# Patient Record
Sex: FEMALE | Race: WHITE | NOT HISPANIC OR LATINO
[De-identification: names, ages, dates, MRNs, and addresses within clinical notes are randomized per-mention and may not be internally consistent; named-entity substitution may affect disease eponyms.]

---

## 2017-02-23 ENCOUNTER — MEDICATION RENEWAL (OUTPATIENT)
Age: 72
End: 2017-02-23

## 2017-05-25 ENCOUNTER — MEDICATION RENEWAL (OUTPATIENT)
Age: 72
End: 2017-05-25

## 2017-07-19 ENCOUNTER — APPOINTMENT (OUTPATIENT)
Dept: INTERNAL MEDICINE | Facility: CLINIC | Age: 72
End: 2017-07-19
Payer: MEDICARE

## 2017-07-19 VITALS
OXYGEN SATURATION: 97 % | SYSTOLIC BLOOD PRESSURE: 136 MMHG | BODY MASS INDEX: 26.54 KG/M2 | TEMPERATURE: 98 F | RESPIRATION RATE: 14 BRPM | WEIGHT: 123 LBS | DIASTOLIC BLOOD PRESSURE: 70 MMHG | HEART RATE: 71 BPM | HEIGHT: 57 IN

## 2017-07-19 PROCEDURE — G0439: CPT

## 2017-07-19 PROCEDURE — 99397 PER PM REEVAL EST PAT 65+ YR: CPT

## 2017-07-20 LAB
25(OH)D3 SERPL-MCNC: 39.5 NG/ML
ALBUMIN SERPL ELPH-MCNC: 4.7 G/DL
ALP BLD-CCNC: 77 U/L
ALT SERPL-CCNC: 20 U/L
ANION GAP SERPL CALC-SCNC: 15 MMOL/L
APPEARANCE: CLEAR
AST SERPL-CCNC: 21 U/L
BACTERIA: NEGATIVE
BASOPHILS # BLD AUTO: 0.02 K/UL
BASOPHILS NFR BLD AUTO: 0.4 %
BILIRUB SERPL-MCNC: 0.6 MG/DL
BILIRUBIN URINE: NEGATIVE
BLOOD URINE: ABNORMAL
BUN SERPL-MCNC: 13 MG/DL
CALCIUM SERPL-MCNC: 9.8 MG/DL
CHLORIDE SERPL-SCNC: 103 MMOL/L
CHOLEST SERPL-MCNC: 218 MG/DL
CHOLEST/HDLC SERPL: 3.2 RATIO
CO2 SERPL-SCNC: 25 MMOL/L
COLOR: YELLOW
CREAT SERPL-MCNC: 0.77 MG/DL
EOSINOPHIL # BLD AUTO: 0.21 K/UL
EOSINOPHIL NFR BLD AUTO: 3.9 %
FOLATE SERPL-MCNC: >20 NG/ML
GLUCOSE QUALITATIVE U: NORMAL MG/DL
GLUCOSE SERPL-MCNC: 102 MG/DL
HBA1C MFR BLD HPLC: 5.5 %
HCT VFR BLD CALC: 40.1 %
HDLC SERPL-MCNC: 68 MG/DL
HGB BLD-MCNC: 12.9 G/DL
IMM GRANULOCYTES NFR BLD AUTO: 0 %
KETONES URINE: NEGATIVE
LDLC SERPL CALC-MCNC: 127 MG/DL
LEUKOCYTE ESTERASE URINE: NEGATIVE
LYMPHOCYTES # BLD AUTO: 1.46 K/UL
LYMPHOCYTES NFR BLD AUTO: 27.3 %
MAN DIFF?: NORMAL
MCHC RBC-ENTMCNC: 27.5 PG
MCHC RBC-ENTMCNC: 32.2 GM/DL
MCV RBC AUTO: 85.5 FL
MICROSCOPIC-UA: NORMAL
MONOCYTES # BLD AUTO: 0.48 K/UL
MONOCYTES NFR BLD AUTO: 9 %
NEUTROPHILS # BLD AUTO: 3.17 K/UL
NEUTROPHILS NFR BLD AUTO: 59.4 %
NITRITE URINE: NEGATIVE
PH URINE: 5
PLATELET # BLD AUTO: 272 K/UL
POTASSIUM SERPL-SCNC: 4.6 MMOL/L
PROT SERPL-MCNC: 7.3 G/DL
PROTEIN URINE: NEGATIVE MG/DL
RBC # BLD: 4.69 M/UL
RBC # FLD: 13.8 %
RED BLOOD CELLS URINE: 1 /HPF
SODIUM SERPL-SCNC: 143 MMOL/L
SPECIFIC GRAVITY URINE: 1.01
SQUAMOUS EPITHELIAL CELLS: 0 /HPF
TRIGL SERPL-MCNC: 117 MG/DL
TSH SERPL-ACNC: 2.73 UIU/ML
UROBILINOGEN URINE: NORMAL MG/DL
VIT B12 SERPL-MCNC: 499 PG/ML
WBC # FLD AUTO: 5.34 K/UL
WHITE BLOOD CELLS URINE: 0 /HPF

## 2017-07-24 LAB — HEMOCCULT STL QL IA: NEGATIVE

## 2017-08-09 ENCOUNTER — MEDICATION RENEWAL (OUTPATIENT)
Age: 72
End: 2017-08-09

## 2018-04-06 ENCOUNTER — MEDICATION RENEWAL (OUTPATIENT)
Age: 73
End: 2018-04-06

## 2018-06-28 LAB
25(OH)D3 SERPL-MCNC: 31.3 NG/ML
ALBUMIN SERPL ELPH-MCNC: 4.4 G/DL
ALP BLD-CCNC: 75 U/L
ALT SERPL-CCNC: 12 U/L
ANION GAP SERPL CALC-SCNC: 13 MMOL/L
APPEARANCE: CLEAR
AST SERPL-CCNC: 19 U/L
BACTERIA: NEGATIVE
BASOPHILS # BLD AUTO: 0.02 K/UL
BASOPHILS NFR BLD AUTO: 0.4 %
BILIRUB SERPL-MCNC: 0.6 MG/DL
BILIRUBIN URINE: NEGATIVE
BLOOD URINE: ABNORMAL
BUN SERPL-MCNC: 15 MG/DL
CALCIUM SERPL-MCNC: 9.4 MG/DL
CHLORIDE SERPL-SCNC: 103 MMOL/L
CHOLEST SERPL-MCNC: 187 MG/DL
CHOLEST/HDLC SERPL: 2.8 RATIO
CO2 SERPL-SCNC: 24 MMOL/L
COLOR: YELLOW
CREAT SERPL-MCNC: 0.73 MG/DL
EOSINOPHIL # BLD AUTO: 0.15 K/UL
EOSINOPHIL NFR BLD AUTO: 3.4 %
FOLATE SERPL-MCNC: >20 NG/ML
GLUCOSE QUALITATIVE U: NEGATIVE MG/DL
GLUCOSE SERPL-MCNC: 97 MG/DL
HBA1C MFR BLD HPLC: 5.3 %
HCT VFR BLD CALC: 40 %
HDLC SERPL-MCNC: 66 MG/DL
HGB BLD-MCNC: 12.9 G/DL
IMM GRANULOCYTES NFR BLD AUTO: 0.2 %
KETONES URINE: NEGATIVE
LDLC SERPL CALC-MCNC: 99 MG/DL
LEUKOCYTE ESTERASE URINE: NEGATIVE
LYMPHOCYTES # BLD AUTO: 1.4 K/UL
LYMPHOCYTES NFR BLD AUTO: 31.5 %
MAN DIFF?: NORMAL
MCHC RBC-ENTMCNC: 27.4 PG
MCHC RBC-ENTMCNC: 32.3 GM/DL
MCV RBC AUTO: 85.1 FL
MICROSCOPIC-UA: NORMAL
MONOCYTES # BLD AUTO: 0.57 K/UL
MONOCYTES NFR BLD AUTO: 12.8 %
NEUTROPHILS # BLD AUTO: 2.3 K/UL
NEUTROPHILS NFR BLD AUTO: 51.7 %
NITRITE URINE: NEGATIVE
PH URINE: 5
PLATELET # BLD AUTO: 255 K/UL
POTASSIUM SERPL-SCNC: 4.1 MMOL/L
PROT SERPL-MCNC: 7.3 G/DL
PROTEIN URINE: NEGATIVE MG/DL
RBC # BLD: 4.7 M/UL
RBC # FLD: 13.6 %
RED BLOOD CELLS URINE: 3 /HPF
SODIUM SERPL-SCNC: 140 MMOL/L
SPECIFIC GRAVITY URINE: 1.02
SQUAMOUS EPITHELIAL CELLS: 1 /HPF
TRIGL SERPL-MCNC: 109 MG/DL
TSH SERPL-ACNC: 2.32 UIU/ML
UROBILINOGEN URINE: NEGATIVE MG/DL
VIT B12 SERPL-MCNC: 512 PG/ML
WBC # FLD AUTO: 4.45 K/UL
WHITE BLOOD CELLS URINE: 1 /HPF

## 2018-06-29 ENCOUNTER — MEDICATION RENEWAL (OUTPATIENT)
Age: 73
End: 2018-06-29

## 2018-07-03 ENCOUNTER — APPOINTMENT (OUTPATIENT)
Dept: INTERNAL MEDICINE | Facility: CLINIC | Age: 73
End: 2018-07-03
Payer: MEDICARE

## 2018-07-03 VITALS
TEMPERATURE: 98.3 F | HEIGHT: 57 IN | HEART RATE: 87 BPM | BODY MASS INDEX: 26.54 KG/M2 | SYSTOLIC BLOOD PRESSURE: 126 MMHG | WEIGHT: 123 LBS | OXYGEN SATURATION: 97 % | RESPIRATION RATE: 14 BRPM | DIASTOLIC BLOOD PRESSURE: 72 MMHG

## 2018-07-03 PROCEDURE — 99214 OFFICE O/P EST MOD 30 MIN: CPT

## 2018-07-03 NOTE — HISTORY OF PRESENT ILLNESS
[de-identified] : pt here today for thyroid check. Overall feels well.\par Pt did not go for mammogram. Understands risk .\par

## 2018-07-06 ENCOUNTER — APPOINTMENT (OUTPATIENT)
Dept: INTERNAL MEDICINE | Facility: CLINIC | Age: 73
End: 2018-07-06

## 2018-07-24 ENCOUNTER — APPOINTMENT (OUTPATIENT)
Dept: INTERNAL MEDICINE | Facility: CLINIC | Age: 73
End: 2018-07-24

## 2018-12-17 ENCOUNTER — APPOINTMENT (OUTPATIENT)
Dept: INTERNAL MEDICINE | Facility: CLINIC | Age: 73
End: 2018-12-17
Payer: MEDICARE

## 2018-12-17 PROCEDURE — G0008: CPT

## 2018-12-17 PROCEDURE — 90662 IIV NO PRSV INCREASED AG IM: CPT

## 2019-02-15 ENCOUNTER — MEDICATION RENEWAL (OUTPATIENT)
Age: 74
End: 2019-02-15

## 2019-05-06 LAB
ALBUMIN SERPL ELPH-MCNC: 4.5 G/DL
ALP BLD-CCNC: 54 U/L
ALT SERPL-CCNC: 14 U/L
ANION GAP SERPL CALC-SCNC: 10 MMOL/L
APPEARANCE: ABNORMAL
AST SERPL-CCNC: 14 U/L
BACTERIA: NEGATIVE
BASOPHILS # BLD AUTO: 0.04 K/UL
BASOPHILS NFR BLD AUTO: 0.8 %
BILIRUB SERPL-MCNC: 0.6 MG/DL
BILIRUBIN URINE: NEGATIVE
BLOOD URINE: ABNORMAL
BUN SERPL-MCNC: 18 MG/DL
CALCIUM SERPL-MCNC: 9.7 MG/DL
CHLORIDE SERPL-SCNC: 102 MMOL/L
CHOLEST SERPL-MCNC: 193 MG/DL
CHOLEST/HDLC SERPL: 3.1 RATIO
CO2 SERPL-SCNC: 27 MMOL/L
COLOR: YELLOW
CREAT SERPL-MCNC: 0.66 MG/DL
EOSINOPHIL # BLD AUTO: 0.23 K/UL
EOSINOPHIL NFR BLD AUTO: 4.4 %
ESTIMATED AVERAGE GLUCOSE: 117 MG/DL
FOLATE SERPL-MCNC: >20 NG/ML
GLUCOSE QUALITATIVE U: NEGATIVE
GLUCOSE SERPL-MCNC: 98 MG/DL
HBA1C MFR BLD HPLC: 5.7 %
HCT VFR BLD CALC: 40.2 %
HDLC SERPL-MCNC: 62 MG/DL
HGB BLD-MCNC: 12.4 G/DL
HYALINE CASTS: 0 /LPF
IMM GRANULOCYTES NFR BLD AUTO: 0.4 %
KETONES URINE: NEGATIVE
LDLC SERPL CALC-MCNC: 114 MG/DL
LEUKOCYTE ESTERASE URINE: NEGATIVE
LYMPHOCYTES # BLD AUTO: 1.73 K/UL
LYMPHOCYTES NFR BLD AUTO: 33.3 %
MAN DIFF?: NORMAL
MCHC RBC-ENTMCNC: 27 PG
MCHC RBC-ENTMCNC: 30.8 GM/DL
MCV RBC AUTO: 87.6 FL
MICROSCOPIC-UA: NORMAL
MONOCYTES # BLD AUTO: 0.63 K/UL
MONOCYTES NFR BLD AUTO: 12.1 %
NEUTROPHILS # BLD AUTO: 2.54 K/UL
NEUTROPHILS NFR BLD AUTO: 49 %
NITRITE URINE: NEGATIVE
PH URINE: 6.5
PLATELET # BLD AUTO: 244 K/UL
POTASSIUM SERPL-SCNC: 4 MMOL/L
PROT SERPL-MCNC: 6.8 G/DL
PROTEIN URINE: NORMAL
RBC # BLD: 4.59 M/UL
RBC # FLD: 13.4 %
RED BLOOD CELLS URINE: 3 /HPF
SODIUM SERPL-SCNC: 139 MMOL/L
SPECIFIC GRAVITY URINE: 1.02
SQUAMOUS EPITHELIAL CELLS: 1 /HPF
TRIGL SERPL-MCNC: 87 MG/DL
TSH SERPL-ACNC: 3.45 UIU/ML
UROBILINOGEN URINE: NORMAL
VIT B12 SERPL-MCNC: 601 PG/ML
WBC # FLD AUTO: 5.19 K/UL
WHITE BLOOD CELLS URINE: 1 /HPF

## 2019-05-07 LAB — 25(OH)D3 SERPL-MCNC: 39.2 NG/ML

## 2019-05-13 ENCOUNTER — NON-APPOINTMENT (OUTPATIENT)
Age: 74
End: 2019-05-13

## 2019-05-13 ENCOUNTER — APPOINTMENT (OUTPATIENT)
Dept: INTERNAL MEDICINE | Facility: CLINIC | Age: 74
End: 2019-05-13
Payer: MEDICARE

## 2019-05-13 VITALS
BODY MASS INDEX: 26.32 KG/M2 | HEART RATE: 75 BPM | RESPIRATION RATE: 14 BRPM | HEIGHT: 57 IN | DIASTOLIC BLOOD PRESSURE: 70 MMHG | OXYGEN SATURATION: 98 % | WEIGHT: 122 LBS | SYSTOLIC BLOOD PRESSURE: 120 MMHG | TEMPERATURE: 98.2 F

## 2019-05-13 PROCEDURE — 93000 ELECTROCARDIOGRAM COMPLETE: CPT

## 2019-05-13 PROCEDURE — G0439: CPT

## 2019-05-13 NOTE — PHYSICAL EXAM

## 2019-05-13 NOTE — HEALTH RISK ASSESSMENT
[Very Good] : ~his/her~  mood as very good [] : No [No falls in past year] : Patient reported no falls in the past year [0] : 2) Feeling down, depressed, or hopeless: Not at all (0) [MammogramComments] : n

## 2019-05-14 ENCOUNTER — APPOINTMENT (OUTPATIENT)
Dept: INTERNAL MEDICINE | Facility: CLINIC | Age: 74
End: 2019-05-14

## 2019-06-04 ENCOUNTER — RX RENEWAL (OUTPATIENT)
Age: 74
End: 2019-06-04

## 2019-11-11 ENCOUNTER — RX RENEWAL (OUTPATIENT)
Age: 74
End: 2019-11-11

## 2019-11-13 ENCOUNTER — APPOINTMENT (OUTPATIENT)
Dept: INTERNAL MEDICINE | Facility: CLINIC | Age: 74
End: 2019-11-13
Payer: MEDICARE

## 2019-11-13 DIAGNOSIS — Z23 ENCOUNTER FOR IMMUNIZATION: ICD-10-CM

## 2019-11-13 PROCEDURE — G0008: CPT

## 2019-11-13 PROCEDURE — 90653 IIV ADJUVANT VACCINE IM: CPT

## 2020-02-04 ENCOUNTER — RX RENEWAL (OUTPATIENT)
Age: 75
End: 2020-02-04

## 2020-07-30 ENCOUNTER — APPOINTMENT (OUTPATIENT)
Dept: INTERNAL MEDICINE | Facility: CLINIC | Age: 75
End: 2020-07-30
Payer: MEDICARE

## 2020-07-30 VITALS
SYSTOLIC BLOOD PRESSURE: 136 MMHG | TEMPERATURE: 98.2 F | BODY MASS INDEX: 25.46 KG/M2 | WEIGHT: 118 LBS | HEIGHT: 57 IN | DIASTOLIC BLOOD PRESSURE: 84 MMHG | RESPIRATION RATE: 14 BRPM | HEART RATE: 72 BPM | OXYGEN SATURATION: 98 %

## 2020-07-30 DIAGNOSIS — M41.9 SCOLIOSIS, UNSPECIFIED: ICD-10-CM

## 2020-07-30 PROCEDURE — 99213 OFFICE O/P EST LOW 20 MIN: CPT

## 2020-07-30 NOTE — PHYSICAL EXAM
[No Acute Distress] : no acute distress [Well Nourished] : well nourished [Well Developed] : well developed [Well-Appearing] : well-appearing [Normal Sclera/Conjunctiva] : normal sclera/conjunctiva [PERRL] : pupils equal round and reactive to light [Normal Outer Ear/Nose] : the outer ears and nose were normal in appearance [EOMI] : extraocular movements intact [No JVD] : no jugular venous distention [Normal Oropharynx] : the oropharynx was normal [No Lymphadenopathy] : no lymphadenopathy [Thyroid Normal, No Nodules] : the thyroid was normal and there were no nodules present [Supple] : supple [Clear to Auscultation] : lungs were clear to auscultation bilaterally [No Accessory Muscle Use] : no accessory muscle use [No Respiratory Distress] : no respiratory distress  [Normal Rate] : normal rate  [Normal S1, S2] : normal S1 and S2 [Regular Rhythm] : with a regular rhythm [No Carotid Bruits] : no carotid bruits [No Murmur] : no murmur heard [Pedal Pulses Present] : the pedal pulses are present [No Varicosities] : no varicosities [No Abdominal Bruit] : a ~M bruit was not heard ~T in the abdomen [No Palpable Aorta] : no palpable aorta [No Edema] : there was no peripheral edema [No Extremity Clubbing/Cyanosis] : no extremity clubbing/cyanosis [Soft] : abdomen soft [Non Tender] : non-tender [Non-distended] : non-distended [No Masses] : no abdominal mass palpated [Normal Bowel Sounds] : normal bowel sounds [No HSM] : no HSM [Normal Anterior Cervical Nodes] : no anterior cervical lymphadenopathy [Normal Posterior Cervical Nodes] : no posterior cervical lymphadenopathy [No CVA Tenderness] : no CVA  tenderness [No Spinal Tenderness] : no spinal tenderness [No Joint Swelling] : no joint swelling [Grossly Normal Strength/Tone] : grossly normal strength/tone [No Rash] : no rash [Coordination Grossly Intact] : coordination grossly intact [No Focal Deficits] : no focal deficits [Normal Affect] : the affect was normal [Normal Gait] : normal gait [Normal Insight/Judgement] : insight and judgment were intact

## 2020-07-31 LAB
25(OH)D3 SERPL-MCNC: 33.8 NG/ML
ALBUMIN SERPL ELPH-MCNC: 4.7 G/DL
ALP BLD-CCNC: 61 U/L
ALT SERPL-CCNC: 9 U/L
ANION GAP SERPL CALC-SCNC: 14 MMOL/L
APPEARANCE: CLEAR
AST SERPL-CCNC: 14 U/L
BACTERIA: NEGATIVE
BASOPHILS # BLD AUTO: 0.05 K/UL
BASOPHILS NFR BLD AUTO: 0.8 %
BILIRUB SERPL-MCNC: 0.4 MG/DL
BILIRUBIN URINE: NEGATIVE
BLOOD URINE: ABNORMAL
BUN SERPL-MCNC: 16 MG/DL
CALCIUM SERPL-MCNC: 9.7 MG/DL
CHLORIDE SERPL-SCNC: 102 MMOL/L
CHOLEST SERPL-MCNC: 187 MG/DL
CHOLEST/HDLC SERPL: 3 RATIO
CO2 SERPL-SCNC: 25 MMOL/L
COLOR: NORMAL
CREAT SERPL-MCNC: 0.75 MG/DL
EOSINOPHIL # BLD AUTO: 0.11 K/UL
EOSINOPHIL NFR BLD AUTO: 1.8 %
ESTIMATED AVERAGE GLUCOSE: 111 MG/DL
FOLATE SERPL-MCNC: 18.3 NG/ML
GLUCOSE QUALITATIVE U: NEGATIVE
GLUCOSE SERPL-MCNC: 94 MG/DL
HBA1C MFR BLD HPLC: 5.5 %
HCT VFR BLD CALC: 40.1 %
HDLC SERPL-MCNC: 63 MG/DL
HGB BLD-MCNC: 12.8 G/DL
HYALINE CASTS: 1 /LPF
IMM GRANULOCYTES NFR BLD AUTO: 0.2 %
KETONES URINE: NEGATIVE
LDLC SERPL CALC-MCNC: 105 MG/DL
LEUKOCYTE ESTERASE URINE: NEGATIVE
LYMPHOCYTES # BLD AUTO: 1.79 K/UL
LYMPHOCYTES NFR BLD AUTO: 29.4 %
MAN DIFF?: NORMAL
MCHC RBC-ENTMCNC: 27.5 PG
MCHC RBC-ENTMCNC: 31.9 GM/DL
MCV RBC AUTO: 86.2 FL
MICROSCOPIC-UA: NORMAL
MONOCYTES # BLD AUTO: 0.58 K/UL
MONOCYTES NFR BLD AUTO: 9.5 %
NEUTROPHILS # BLD AUTO: 3.55 K/UL
NEUTROPHILS NFR BLD AUTO: 58.3 %
NITRITE URINE: NEGATIVE
PH URINE: 6
PLATELET # BLD AUTO: 280 K/UL
POTASSIUM SERPL-SCNC: 4.4 MMOL/L
PROT SERPL-MCNC: 6.7 G/DL
PROTEIN URINE: NEGATIVE
RBC # BLD: 4.65 M/UL
RBC # FLD: 13.5 %
RED BLOOD CELLS URINE: 3 /HPF
SODIUM SERPL-SCNC: 141 MMOL/L
SPECIFIC GRAVITY URINE: 1.01
SQUAMOUS EPITHELIAL CELLS: 1 /HPF
T4 SERPL-MCNC: 7.5 UG/DL
TRIGL SERPL-MCNC: 91 MG/DL
TSH SERPL-ACNC: 6.25 UIU/ML
UROBILINOGEN URINE: NORMAL
VIT B12 SERPL-MCNC: 475 PG/ML
WBC # FLD AUTO: 6.09 K/UL
WHITE BLOOD CELLS URINE: 1 /HPF

## 2020-08-02 ENCOUNTER — RX RENEWAL (OUTPATIENT)
Age: 75
End: 2020-08-02

## 2020-09-08 ENCOUNTER — APPOINTMENT (OUTPATIENT)
Dept: INTERNAL MEDICINE | Facility: CLINIC | Age: 75
End: 2020-09-08
Payer: MEDICARE

## 2020-09-08 ENCOUNTER — NON-APPOINTMENT (OUTPATIENT)
Age: 75
End: 2020-09-08

## 2020-09-08 ENCOUNTER — LABORATORY RESULT (OUTPATIENT)
Age: 75
End: 2020-09-08

## 2020-09-08 VITALS
RESPIRATION RATE: 14 BRPM | TEMPERATURE: 97.6 F | HEART RATE: 86 BPM | OXYGEN SATURATION: 97 % | BODY MASS INDEX: 25.89 KG/M2 | HEIGHT: 57 IN | SYSTOLIC BLOOD PRESSURE: 110 MMHG | DIASTOLIC BLOOD PRESSURE: 70 MMHG | WEIGHT: 120 LBS

## 2020-09-08 PROCEDURE — G0439: CPT

## 2020-09-08 PROCEDURE — 93000 ELECTROCARDIOGRAM COMPLETE: CPT

## 2020-09-08 NOTE — PHYSICAL EXAM
[No Acute Distress] : no acute distress [Well Nourished] : well nourished [Well Developed] : well developed [Well-Appearing] : well-appearing [Normal Sclera/Conjunctiva] : normal sclera/conjunctiva [PERRL] : pupils equal round and reactive to light [Normal Outer Ear/Nose] : the outer ears and nose were normal in appearance [EOMI] : extraocular movements intact [Normal Oropharynx] : the oropharynx was normal [No JVD] : no jugular venous distention [No Lymphadenopathy] : no lymphadenopathy [Thyroid Normal, No Nodules] : the thyroid was normal and there were no nodules present [Supple] : supple [No Respiratory Distress] : no respiratory distress  [No Accessory Muscle Use] : no accessory muscle use [Normal Rate] : normal rate  [Clear to Auscultation] : lungs were clear to auscultation bilaterally [Regular Rhythm] : with a regular rhythm [Normal S1, S2] : normal S1 and S2 [No Murmur] : no murmur heard [No Carotid Bruits] : no carotid bruits [No Abdominal Bruit] : a ~M bruit was not heard ~T in the abdomen [No Varicosities] : no varicosities [Pedal Pulses Present] : the pedal pulses are present [No Palpable Aorta] : no palpable aorta [No Edema] : there was no peripheral edema [No Extremity Clubbing/Cyanosis] : no extremity clubbing/cyanosis [Soft] : abdomen soft [Non Tender] : non-tender [Non-distended] : non-distended [No Masses] : no abdominal mass palpated [Normal Bowel Sounds] : normal bowel sounds [No HSM] : no HSM [Normal Posterior Cervical Nodes] : no posterior cervical lymphadenopathy [Normal Anterior Cervical Nodes] : no anterior cervical lymphadenopathy [No CVA Tenderness] : no CVA  tenderness [No Spinal Tenderness] : no spinal tenderness [No Joint Swelling] : no joint swelling [Grossly Normal Strength/Tone] : grossly normal strength/tone [Coordination Grossly Intact] : coordination grossly intact [No Rash] : no rash [No Focal Deficits] : no focal deficits [Normal Gait] : normal gait [Normal Insight/Judgement] : insight and judgment were intact [Normal Affect] : the affect was normal

## 2020-09-08 NOTE — ASSESSMENT
[FreeTextEntry1] : HCM- needs mammogram and screening colon\par Hypothyroidism- check repeat bloods today\par Optic nerve swelling- to get MRI tomorrow ordered by  neuro opthalmology

## 2020-09-09 LAB
T3RU NFR SERPL: 1 TBI
T4 FREE SERPL-MCNC: 1.6 NG/DL
TSH SERPL-ACNC: 5.24 UIU/ML

## 2020-11-05 ENCOUNTER — LABORATORY RESULT (OUTPATIENT)
Age: 75
End: 2020-11-05

## 2020-11-05 ENCOUNTER — APPOINTMENT (OUTPATIENT)
Dept: INTERNAL MEDICINE | Facility: CLINIC | Age: 75
End: 2020-11-05
Payer: MEDICARE

## 2020-11-05 PROCEDURE — 99072 ADDL SUPL MATRL&STAF TM PHE: CPT

## 2020-11-05 PROCEDURE — 90662 IIV NO PRSV INCREASED AG IM: CPT

## 2020-11-05 PROCEDURE — G0008: CPT

## 2020-11-06 LAB
T3RU NFR SERPL: 0.9 TBI
T4 FREE SERPL-MCNC: 1.8 NG/DL
TSH SERPL-ACNC: 1.62 UIU/ML

## 2021-01-13 ENCOUNTER — RX RENEWAL (OUTPATIENT)
Age: 76
End: 2021-01-13

## 2021-05-19 ENCOUNTER — RX RENEWAL (OUTPATIENT)
Age: 76
End: 2021-05-19

## 2021-11-03 ENCOUNTER — RX RENEWAL (OUTPATIENT)
Age: 76
End: 2021-11-03

## 2021-11-12 ENCOUNTER — NON-APPOINTMENT (OUTPATIENT)
Age: 76
End: 2021-11-12

## 2021-11-12 ENCOUNTER — APPOINTMENT (OUTPATIENT)
Dept: INTERNAL MEDICINE | Facility: CLINIC | Age: 76
End: 2021-11-12
Payer: MEDICARE

## 2021-11-12 VITALS
TEMPERATURE: 97 F | BODY MASS INDEX: 17.48 KG/M2 | WEIGHT: 118 LBS | RESPIRATION RATE: 14 BRPM | HEART RATE: 76 BPM | DIASTOLIC BLOOD PRESSURE: 70 MMHG | SYSTOLIC BLOOD PRESSURE: 130 MMHG | OXYGEN SATURATION: 93 % | HEIGHT: 69 IN

## 2021-11-12 DIAGNOSIS — R92.2 INCONCLUSIVE MAMMOGRAM: ICD-10-CM

## 2021-11-12 PROCEDURE — 90662 IIV NO PRSV INCREASED AG IM: CPT

## 2021-11-12 PROCEDURE — G0008: CPT

## 2021-11-12 PROCEDURE — G0439: CPT

## 2021-11-13 LAB
25(OH)D3 SERPL-MCNC: 29 NG/ML
ALBUMIN SERPL ELPH-MCNC: 4.4 G/DL
ALP BLD-CCNC: 65 U/L
ALT SERPL-CCNC: 9 U/L
ANION GAP SERPL CALC-SCNC: 17 MMOL/L
APPEARANCE: CLEAR
AST SERPL-CCNC: 13 U/L
BACTERIA: NEGATIVE
BASOPHILS # BLD AUTO: 0.04 K/UL
BASOPHILS NFR BLD AUTO: 0.7 %
BILIRUB SERPL-MCNC: 0.4 MG/DL
BILIRUBIN URINE: NEGATIVE
BLOOD URINE: NORMAL
BUN SERPL-MCNC: 16 MG/DL
CALCIUM SERPL-MCNC: 9.8 MG/DL
CHLORIDE SERPL-SCNC: 103 MMOL/L
CO2 SERPL-SCNC: 21 MMOL/L
COLOR: NORMAL
CREAT SERPL-MCNC: 0.73 MG/DL
EOSINOPHIL # BLD AUTO: 0.11 K/UL
EOSINOPHIL NFR BLD AUTO: 2 %
ESTIMATED AVERAGE GLUCOSE: 114 MG/DL
FOLATE SERPL-MCNC: 11.1 NG/ML
GLUCOSE QUALITATIVE U: NEGATIVE
GLUCOSE SERPL-MCNC: 89 MG/DL
HBA1C MFR BLD HPLC: 5.6 %
HCT VFR BLD CALC: 38.8 %
HGB BLD-MCNC: 12.2 G/DL
HYALINE CASTS: 1 /LPF
IMM GRANULOCYTES NFR BLD AUTO: 0.2 %
KETONES URINE: NEGATIVE
LEUKOCYTE ESTERASE URINE: NEGATIVE
LYMPHOCYTES # BLD AUTO: 1.54 K/UL
LYMPHOCYTES NFR BLD AUTO: 28.5 %
MAN DIFF?: NORMAL
MCHC RBC-ENTMCNC: 26.9 PG
MCHC RBC-ENTMCNC: 31.4 GM/DL
MCV RBC AUTO: 85.5 FL
MICROSCOPIC-UA: NORMAL
MONOCYTES # BLD AUTO: 0.68 K/UL
MONOCYTES NFR BLD AUTO: 12.6 %
NEUTROPHILS # BLD AUTO: 3.03 K/UL
NEUTROPHILS NFR BLD AUTO: 56 %
NITRITE URINE: NEGATIVE
PH URINE: 5
PLATELET # BLD AUTO: 272 K/UL
POTASSIUM SERPL-SCNC: 4 MMOL/L
PROT SERPL-MCNC: 6.7 G/DL
PROTEIN URINE: NEGATIVE
RBC # BLD: 4.54 M/UL
RBC # FLD: 13.4 %
RED BLOOD CELLS URINE: 1 /HPF
SODIUM SERPL-SCNC: 140 MMOL/L
SPECIFIC GRAVITY URINE: 1.01
SQUAMOUS EPITHELIAL CELLS: 0 /HPF
TSH SERPL-ACNC: 1.19 UIU/ML
UROBILINOGEN URINE: NORMAL
VIT B12 SERPL-MCNC: 456 PG/ML
WBC # FLD AUTO: 5.41 K/UL
WHITE BLOOD CELLS URINE: 1 /HPF

## 2022-04-29 ENCOUNTER — RX RENEWAL (OUTPATIENT)
Age: 77
End: 2022-04-29

## 2022-07-27 ENCOUNTER — RX RENEWAL (OUTPATIENT)
Age: 77
End: 2022-07-27

## 2022-09-12 ENCOUNTER — RX RENEWAL (OUTPATIENT)
Age: 77
End: 2022-09-12

## 2022-10-08 ENCOUNTER — EMERGENCY (EMERGENCY)
Facility: HOSPITAL | Age: 77
LOS: 1 days | Discharge: LEFT BEFORE DISCHARGE | End: 2022-10-08
Attending: STUDENT IN AN ORGANIZED HEALTH CARE EDUCATION/TRAINING PROGRAM | Admitting: STUDENT IN AN ORGANIZED HEALTH CARE EDUCATION/TRAINING PROGRAM

## 2022-10-08 VITALS
OXYGEN SATURATION: 100 % | RESPIRATION RATE: 18 BRPM | HEART RATE: 81 BPM | DIASTOLIC BLOOD PRESSURE: 65 MMHG | TEMPERATURE: 98 F | SYSTOLIC BLOOD PRESSURE: 134 MMHG

## 2022-10-08 VITALS
HEART RATE: 71 BPM | RESPIRATION RATE: 17 BRPM | DIASTOLIC BLOOD PRESSURE: 80 MMHG | SYSTOLIC BLOOD PRESSURE: 153 MMHG | OXYGEN SATURATION: 100 % | TEMPERATURE: 98 F

## 2022-10-08 LAB
ALBUMIN SERPL ELPH-MCNC: 4.8 G/DL — SIGNIFICANT CHANGE UP (ref 3.3–5)
ALP SERPL-CCNC: 64 U/L — SIGNIFICANT CHANGE UP (ref 40–120)
ALT FLD-CCNC: 11 U/L — SIGNIFICANT CHANGE UP (ref 4–33)
ANION GAP SERPL CALC-SCNC: 14 MMOL/L — SIGNIFICANT CHANGE UP (ref 7–14)
AST SERPL-CCNC: 17 U/L — SIGNIFICANT CHANGE UP (ref 4–32)
BASOPHILS # BLD AUTO: 0.03 K/UL — SIGNIFICANT CHANGE UP (ref 0–0.2)
BASOPHILS NFR BLD AUTO: 0.4 % — SIGNIFICANT CHANGE UP (ref 0–2)
BILIRUB SERPL-MCNC: 0.8 MG/DL — SIGNIFICANT CHANGE UP (ref 0.2–1.2)
BUN SERPL-MCNC: 10 MG/DL — SIGNIFICANT CHANGE UP (ref 7–23)
CALCIUM SERPL-MCNC: 9.5 MG/DL — SIGNIFICANT CHANGE UP (ref 8.4–10.5)
CHLORIDE SERPL-SCNC: 105 MMOL/L — SIGNIFICANT CHANGE UP (ref 98–107)
CO2 SERPL-SCNC: 23 MMOL/L — SIGNIFICANT CHANGE UP (ref 22–31)
CREAT SERPL-MCNC: 0.66 MG/DL — SIGNIFICANT CHANGE UP (ref 0.5–1.3)
EGFR: 89 ML/MIN/1.73M2 — SIGNIFICANT CHANGE UP
EOSINOPHIL # BLD AUTO: 0.05 K/UL — SIGNIFICANT CHANGE UP (ref 0–0.5)
EOSINOPHIL NFR BLD AUTO: 0.6 % — SIGNIFICANT CHANGE UP (ref 0–6)
GLUCOSE SERPL-MCNC: 105 MG/DL — HIGH (ref 70–99)
HCT VFR BLD CALC: 41.9 % — SIGNIFICANT CHANGE UP (ref 34.5–45)
HGB BLD-MCNC: 13.4 G/DL — SIGNIFICANT CHANGE UP (ref 11.5–15.5)
IANC: 5.06 K/UL — SIGNIFICANT CHANGE UP (ref 1.8–7.4)
IMM GRANULOCYTES NFR BLD AUTO: 0.3 % — SIGNIFICANT CHANGE UP (ref 0–0.9)
LYMPHOCYTES # BLD AUTO: 1.68 K/UL — SIGNIFICANT CHANGE UP (ref 1–3.3)
LYMPHOCYTES # BLD AUTO: 21.7 % — SIGNIFICANT CHANGE UP (ref 13–44)
MCHC RBC-ENTMCNC: 26.7 PG — LOW (ref 27–34)
MCHC RBC-ENTMCNC: 32 GM/DL — SIGNIFICANT CHANGE UP (ref 32–36)
MCV RBC AUTO: 83.5 FL — SIGNIFICANT CHANGE UP (ref 80–100)
MONOCYTES # BLD AUTO: 0.91 K/UL — HIGH (ref 0–0.9)
MONOCYTES NFR BLD AUTO: 11.7 % — SIGNIFICANT CHANGE UP (ref 2–14)
NEUTROPHILS # BLD AUTO: 5.06 K/UL — SIGNIFICANT CHANGE UP (ref 1.8–7.4)
NEUTROPHILS NFR BLD AUTO: 65.3 % — SIGNIFICANT CHANGE UP (ref 43–77)
NRBC # BLD: 0 /100 WBCS — SIGNIFICANT CHANGE UP (ref 0–0)
NRBC # FLD: 0 K/UL — SIGNIFICANT CHANGE UP (ref 0–0)
PLATELET # BLD AUTO: 322 K/UL — SIGNIFICANT CHANGE UP (ref 150–400)
POTASSIUM SERPL-MCNC: 3.7 MMOL/L — SIGNIFICANT CHANGE UP (ref 3.5–5.3)
POTASSIUM SERPL-SCNC: 3.7 MMOL/L — SIGNIFICANT CHANGE UP (ref 3.5–5.3)
PROT SERPL-MCNC: 7.3 G/DL — SIGNIFICANT CHANGE UP (ref 6–8.3)
RBC # BLD: 5.02 M/UL — SIGNIFICANT CHANGE UP (ref 3.8–5.2)
RBC # FLD: 13.9 % — SIGNIFICANT CHANGE UP (ref 10.3–14.5)
SARS-COV-2 RNA SPEC QL NAA+PROBE: SIGNIFICANT CHANGE UP
SODIUM SERPL-SCNC: 142 MMOL/L — SIGNIFICANT CHANGE UP (ref 135–145)
WBC # BLD: 7.75 K/UL — SIGNIFICANT CHANGE UP (ref 3.8–10.5)
WBC # FLD AUTO: 7.75 K/UL — SIGNIFICANT CHANGE UP (ref 3.8–10.5)

## 2022-10-08 PROCEDURE — 99284 EMERGENCY DEPT VISIT MOD MDM: CPT

## 2022-10-08 RX ORDER — ACETAMINOPHEN 500 MG
975 TABLET ORAL ONCE
Refills: 0 | Status: COMPLETED | OUTPATIENT
Start: 2022-10-08 | End: 2022-10-08

## 2022-10-08 RX ADMIN — Medication 975 MILLIGRAM(S): at 13:30

## 2022-10-08 NOTE — ED PROVIDER NOTE - OBJECTIVE STATEMENT
hx hypothyroid, papilledema dx in 2020 with neg MRI placed on latanoprost gtt, now with recurrence of papilledema. saw floaters so went to neuro-ophthalmologist. started on acetazolamide 250mg 2w ago stopped on 10/5. now c/o parietal HA bilaterally with trouble walking secondary to sensation of disequilibrium for the past 3w now worsening over the last week. states that the headache is worse at night but improved when she lays flat. had an episode of diplopia that lasted 5 minutes last week. describes another episode of confusion last week for a few minutes while on acetazolamide. takes dramamine q4h which helps. HA improves with tylenol. no nausea vomiting. no falls. tingling B hands intermittently. no speech changes. no neck pain back pain no fever chills. no changes to headache with valsalva. no hx CAD/strokes. no hx CA. no night sweats or weight loss.     needed sedation for MRI in the past  neuroophthalmologist - Dr. Peg Cunningham

## 2022-10-08 NOTE — ED ADULT NURSE REASSESSMENT NOTE - NS ED NURSE REASSESS COMMENT FT1
4395: Despite education from MD and Nurse patient verbalized wanting to discharged. The patient was informed she would be leaving against medical advice and the patient stated " I understand I rather go home and go to another place that my doctor would recommend.". The patient remains alert and oriented x4. No distress noted at this time. ELISABET ZEPEDA

## 2022-10-08 NOTE — ED ADULT NURSE NOTE - OBJECTIVE STATEMENT
Patient to the ED with c/o of headache after starting medication prescribed by the neuro- ophthalmologist. The patient verbalized taking latanoprost and acetazolamide. The patient verbalized having an double vision last week when she had a headache. Also verbalized her headache is controlled with tylenol PO. The patient stated " 250 mg of tylenol makes me feel better. I normally break a 500 mg tablet ".

## 2022-10-08 NOTE — ED PROVIDER NOTE - CLINICAL SUMMARY MEDICAL DECISION MAKING FREE TEXT BOX
well appearing female presents at recommendation of neuro-ophthalmology for MRI in the setting of recurrent papilledema refractory to ICP lowering meds and IOP lowering drops. otherwise neurointact aside from APD. optho cs screening labs TBA for sedated MRI

## 2022-10-08 NOTE — ED PROVIDER NOTE - ATTENDING CONTRIBUTION TO CARE
I have personally performed a face to face medical and diagnostic evaluation of the patient. I have discussed with and reviewed the Resident's note and agree with the History, ROS, Physical Exam and MDM unless otherwise indicated. A brief summary of my personal evaluation and impression can be found below.    David SALDANA: 77F hx of hypothyroid, papilledema dx in 2020 with neg MRI placed on latanoprost gtt, now with recurrence of papilledema. saw floaters so went to neuro-ophthalmologist. started on acetazolamide 250mg 2w ago stopped on 10/5. now c/o parietal HA bilaterally with trouble walking secondary to sensation of disequilibrium for the past 3w now worsening over the last week, was seen today by neuro opthalmologic was told to come to ED for eval given concern there for possible papilledema. No speech changes. Denies numbness, tingling or loss of sensation. Denies loss of motor function. No vision changes at present. Denies n/v/f/c/cp/sob. Denies syncope, lightheadedness, dizziness. Denies chest palpitations, abdominal pain. Denies edema. Denies dysuria, hematuria, BRBPR, tarry stools, diarrhea, constipation.     All other ROS negative, except as above and as per HPI and ROS section.      VITALS: Initial triage and subsequent vitals have been reviewed by me.  GEN APPEARANCE: WDWN, alert, non-toxic, NAD  HEAD: Atraumatic.  EYES: PERRLa, EOMI, vision grossly intact.   NECK: Supple  CV: RRR, S1S2, no c/r/m/g. Cap refill <2 seconds. No bruits.   LUNGS: CTAB. No abnormal breath sounds.  ABDOMEN: Soft, NTND. No guarding or rebound.   MSK/EXT: No spinal or extremity point tenderness. No CVA ttp. Pelvis stable. No peripheral edema.  NEURO: Alert, follows commands. Speech normal. Sensation and motor normal x4 extremities. CN2-12 normal, coordination normal, UE & LE 5/5 b/l.  SKIN: Warm, dry and intact. No rash.  PSYCH: Appropriate    Plan/MDM: David SALDANA: 77F hx of hypothyroid, papilledema dx in 2020 with neg MRI placed on latanoprost gtt, now with recurrence of papilledema. saw floaters so went to neuro-ophthalmologist. started on acetazolamide 250mg 2w ago stopped on 10/5. now c/o parietal HA bilaterally with trouble walking secondary to sensation of disequilibrium for the past 3w now worsening over the last week, was seen today by neuro opthalmologic was told to come to ED for eval given concern there for possible papilledema. No speech changes. Denies numbness, tingling or loss of sensation. Denies loss of motor function. No vision changes at present. exam vss non toxic PE as above optho at bedside during assessment - optho to perform comprehensive exam, ddx c/f likely papilledema as dx'd outpatient, unclear etiology thereof, optho rec plan for mri, will check labs cta head/neck, re-discuss w optho anticipate admission for mri.

## 2022-10-08 NOTE — CONSULT NOTE ADULT - ASSESSMENT
Assessment and Recommendations:  77y female with a past medical history/ocular history of hypothyroidism, papilledema consulted for ?APD and papilledema after being sent in by her neuroophthalmologist, found to have swollen optic discs bilaterally consistent with papilledema, with no vision changes. VA 20/25 OU, IOP within normal limits, PERRLA with no APD appreciated on exam, EOMI, CVF full, color plates full, anterior exam with mild cataracts. Dilated exam with optic nerve head swelling with blurred margins, slightly crowded disc OD, edema more significant OD > OS. Discussed with Dr. Cunningham as patient was previously worked up for papilledema in 2020, and appears to have multiple episodes in the past. Stated that she should have MRI for workup. Additionally, OD has always been nerve that has more swelling, no APD was appreciated on prior exams either.    1) Papilledema  - patient with bilateral optic nerve head swelling, previously documented in outpatient records OD > OS  - no vision changes, VA 20/25 stable from visit on 9/24 at [facility]  - no pupillary afferent defect  - slightly hypertensive on presentation with /80s  - no trauma, no falls  - has been having headaches, but   - differential includes ICP, pseudopapilledema, IIH (though patient older population and not obese), AVM  - recommend MRI brain and orbits w and w/o contrast  - recommend MRV as well to rule out any thrombosis  - pending MRI results can consider LP  - recommend neurology consult    Discussed with Dr. Cunningham, neuroophthalmologist. Discussed with Dr. Ruano, neuroophthalmologist.    Outpatient Follow-up: Patient should follow-up with his/her ophthalmologist or with [facility] Department of Ophthalmology within 1 week of after discharge at:    [address]  [place] [address]  [phone]    Bertram Turner MD, PGY2  Also available on [employer] Teams     Assessment and Recommendations:  77y female with a past medical history/ocular history of hypothyroidism, papilledema consulted for ?APD and papilledema after being sent in by her neuroophthalmologist, found to have swollen optic discs bilaterally consistent with papilledema, with no vision changes. VA 20/25 OU, IOP within normal limits, PERRLA with no APD appreciated on exam, EOMI, CVF full, color plates full, anterior exam with mild cataracts. Dilated exam with optic nerve head swelling with blurred margins, slightly crowded disc OD, edema more significant OD > OS. Discussed with Dr. Cunningham as patient was previously worked up for papilledema in 2020, and appears to have multiple episodes in the past. Stated that she should have MRI for workup. Additionally, OD has always been nerve that has more swelling, no APD was appreciated on prior exams either.    1) Papilledema  - patient with bilateral optic nerve head swelling, previously documented in outpatient records OD > OS  - no vision changes, VA 20/25 stable from visit on 9/24 at [facility]  - no pupillary afferent defect  - slightly hypertensive on presentation with /80s  - no trauma, no falls  - has been having headaches, but   - differential includes ICP, pseudopapilledema, IIH (though patient older population and not obese), AVM  - recommend MRI brain and orbits w and w/o contrast  - recommend MRV as well to rule out any thrombosis  - pending MRI results can consider LP  - recommend neurology consult    2) Choroidal nevus, OS  - flat in appearance  - no orange pigment  - close ti disc margin  - no drusen, no halo  - will need further imaging for characterization, unclear if present on prior exam from notes in chart    Discussed with Dr. Cunningham, neuroophthalmologist. Discussed with Dr. Ruano, neuroophthalmologist.    Outpatient Follow-up: Patient should follow-up with his/her ophthalmologist or with [facility] Department of Ophthalmology within 1 week of after discharge at:    [address]  [place] [address]  [phone]    Bertram Turner MD, PGY2  Also available on [employer] Teams

## 2022-10-08 NOTE — ED PROVIDER NOTE - NSFOLLOWUPINSTRUCTIONS_ED_ALL_ED_FT
You were seen in the Emergency Department for headache difficulty walking.     1) Advance activity as tolerated.   2) Continue all previously prescribed medications as directed.    3) Follow up with your primary care physician in 24-48 hours - take copies of your results.    4) Return to the Emergency Department for worsening or persistent symptoms, and/or ANY NEW OR CONCERNING SYMPTOMS.    you are not considered to be a safe discharge. for this reason you have signed a form stating that you are leaving against medical advice before a CT of the brain and its vessels or an MRI could be performed to investigate the cause of your papilledema.     Please return to the ER if any worsening symptoms arise including difficulty speaking, vision changes, numbness or weakness in one or more limbs, falls, severe headache, vomiting, fever, neck pain, eye pain, or any other new or concerning symptoms.     Please let your provider know that the testing was not done and that you need a prescription for imaging outpatient.

## 2022-10-08 NOTE — ED PROVIDER NOTE - PROGRESS NOTE DETAILS
TARI Galeas PGY2 ophtho aware. reaching out to Dr Cunningham TARI Galeas PGY2 improved after tylenol TARI Galeas PGY2 Pt declines CTA, when asked why she stated that she would still have to get the MRI and that the test was not definitive. I explained that the CT/CTA is to evaluate for stroke/TIA/masses which could be picked up earlier and she declines CT. She also wishes to leave against medical advice as her anxiety is too much to handle for a multiple-day hospital stay. Anxiolytics were offered. It was explained to the patient that progression of her condition could lead to blindness, permanent weakness, up to and including death cannot be excluded. Pt understands this and wishes to leave. She also understands that if her condition is to worsen that she would have to start the process over again beginning in the ED. She prefers to go home and schedule MRI with an outpatient center that has previously sedated her for MRIs in the past.

## 2022-10-08 NOTE — ED PROVIDER NOTE - PATIENT PORTAL LINK FT
You can access the FollowMyHealth Patient Portal offered by [facility] by registering at the following website: [email or web address]. By joining [facility] [employer]’s FollowMyHealth portal, you will also be able to view your health information using other applications (apps) compatible with our system.

## 2022-10-08 NOTE — ED PROVIDER NOTE - NS ED ROS FT
Constitutional: no fevers, chills  HEENT: +HA, +vision changes, no rhinorrhea, sore throat  Cardiac: no chest pain, palpitations  Respiratory: no SOB, cough or hemoptysis  GI: no n/v/d/c, abd pain, bloody or dark stools  : no dysuria, frequency, or hematuria  MSK: no joint pain, neck pain or back pain  Skin: no rashes, jaundice, pruritis  Neuro: +int numbness/tingling, no weakness, +unsteady gait  ROS otherwise neg except per hpi

## 2022-10-08 NOTE — CONSULT NOTE ADULT - SUBJECTIVE AND OBJECTIVE BOX
[facility] DEPARTMENT OF OPHTHALMOLOGY - INITIAL ADULT CONSULT  -----------------------------------------------------------------------------------------------------------------  Bertram Turner MD, PGY2  Available on [employer] Teams  -----------------------------------------------------------------------------------------------------------------    HPI:    Patient is a 77 year old female hx hypothyroid, papilledema dx in 2020 with neg MRI placed on latanoprost gtt, now with recurrence of papilledema. saw floaters so went to neuro-ophthalmologist. started on acetazolamide 250mg 2w ago stopped on 10/5. now c/o parietal HA bilaterally with trouble walking secondary to sensation of disequilibrium for the past 3w now worsening over the last week. states that the headache is worse at night but improved when she lays flat. had an episode of diplopia that lasted 5 minutes last week. describes another episode of confusion last week for a few minutes while on acetazolamide. takes dramamine q4h which helps. HA improves with tylenol. no nausea vomiting. no falls. tingling B hands intermittently. no speech changes. no neck pain back pain no fever chills. no changes to headache with valsalva. no hx CAD/strokes. no hx CA. no night sweats or weight loss.     Patient went to [facility] for ophthalmic evaluatoin on 9/24, note states having fan ocular migraines with zig zagging lines and few flashes of light. 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 No changes in vision. VA 20/25 OU at that time. Was recommended to continue her latanoprost and acetazolamide.  spoke with Dr. Cunningham, her neuroophthalmologist this morning, who recommended to go to ER for MRI. Patient also advised to stop acetazolamide. Patient states that her flashes of light started about 3 weeks ago and only happen intermittently. Endorses simialr headache, but no vision changes. Denies any eye pain, no TVL, no sudden black curtain, no floaters, no new flashes, no double vision.     Has needed sedation for MRI in the past    Past Medical History: hypothyroidism,   Past Ocular History: papilledema with negative MRI workup in 2020  Drops: latanoprost  Medications: acetazolamide (stopped recently) 250mg once a day  Allergies: penicillin  Outpatient Ophthalmologist: neuroophthalmologist - Dr. Peg Cunningham      Review of Systems:  Constitutional: No fever, chills  Eyes: No blurry vision, flashes, floaters, FBS, erythema, discharge, double vision, OU  Neuro: No tremors  Cardiovascular: No chest pain, palpitations  Respiratory: No SOB, no cough  GI: No nausea, vomiting, abdominal pain    Vital Signs: T(C): 36.7 (10-08-22 @ 11:04)  T(F): 98 (10-08-22 @ 11:04), Max: 98 (10-08-22 @ 11:04)  HR: 71 (10-08-22 @ 11:04) (71 - 71)  BP: 153/80 (10-08-22 @ 11:04) (153/80 - 153/80)  RR:  (17 - 17)  SpO2:  (100% - 100%)  Wt(kg): --  AAOx3    Ophthalmology Exam:  Visual acuity (cc): 20/25 OU.  Pupils: PERRL OU, no APD  Intraocular Pressure:  Ttono 15 OU  Extraocular movements (EOMs): Full OU, no pain, no diplopia.  Confrontational Visual Field (CVF): Full OU.  Color Plates: 12/12 OU.    Pen Light Exam (PLE)  External: Normal OU.  Lids/Lashes/Lacrimal Ducts: Flat OU.  Sclera/Conjunctiva: White and quiet OU.  Cornea: Clear OU.  Anterior Chamber: Deep and formed OU.    Iris: Flat OU.  Lens: NS OU    Fundus Exam: dilated with 1% tropicamide and 2.5% phenylephrine  Approval obtained from primary team for dilation  Patient aware that pupils can remained dilated for at least 4-6 hours.  Exam performed with 20 D lens    Vitreous: floater OU  Disc, cup/disc: elevated optic nerve with blurred margins, raised vessels OD > OS  Macula: wnl OU  Vessels: wnl OU  Periphery: wnl OU       [facility] DEPARTMENT OF OPHTHALMOLOGY - INITIAL ADULT CONSULT  -----------------------------------------------------------------------------------------------------------------  Bertram Turner MD, PGY2  Available on [employer] Teams  -----------------------------------------------------------------------------------------------------------------    HPI:    Patient is a 77 year old female hx hypothyroid, papilledema dx in 2020 with neg MRI placed on latanoprost gtt, now with recurrence of papilledema. saw floaters so went to neuro-ophthalmologist. started on acetazolamide 250mg 2w ago stopped on 10/5. now c/o parietal HA bilaterally with trouble walking secondary to sensation of disequilibrium for the past 3w now worsening over the last week. states that the headache is worse at night but improved when she lays flat. had an episode of diplopia that lasted 5 minutes last week. describes another episode of confusion last week for a few minutes while on acetazolamide. takes dramamine q4h which helps. HA improves with tylenol. no nausea vomiting. no falls. tingling B hands intermittently. no speech changes. no neck pain back pain no fever chills. no changes to headache with valsalva. no hx CAD/strokes. no hx CA. no night sweats or weight loss.     Patient went to [facility] for ophthalmic evaluatoin on 9/24, note states having fan ocular migraines with zig zagging lines and few flashes of light. 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 No changes in vision. VA 20/25 OU at that time. Was recommended to continue her latanoprost and acetazolamide.  spoke with Dr. Cunningham, her neuroophthalmologist this morning, who recommended to go to ER for MRI. Patient also advised to stop acetazolamide. Patient states that her flashes of light started about 3 weeks ago and only happen intermittently. Endorses simialr headache, but no vision changes. Denies any eye pain, no TVL, no sudden black curtain, no floaters, no new flashes, no double vision.     Has needed sedation for MRI in the past    Past Medical History: hypothyroidism,   Past Ocular History: papilledema with negative MRI workup in 2020  Drops: latanoprost  Medications: acetazolamide (stopped recently) 250mg once a day  Allergies: penicillin  Outpatient Ophthalmologist: neuroophthalmologist - Dr. Peg Cunningham      Review of Systems:  Constitutional: No fever, chills  Eyes: No blurry vision, flashes, floaters, FBS, erythema, discharge, double vision, OU  Neuro: No tremors  Cardiovascular: No chest pain, palpitations  Respiratory: No SOB, no cough  GI: No nausea, vomiting, abdominal pain    Vital Signs: T(C): 36.7 (10-08-22 @ 11:04)  T(F): 98 (10-08-22 @ 11:04), Max: 98 (10-08-22 @ 11:04)  HR: 71 (10-08-22 @ 11:04) (71 - 71)  BP: 153/80 (10-08-22 @ 11:04) (153/80 - 153/80)  RR:  (17 - 17)  SpO2:  (100% - 100%)  Wt(kg): --  AAOx3    Ophthalmology Exam:  Visual acuity (cc): 20/25 OU.  Pupils: PERRL OU, no APD  Intraocular Pressure:  Ttono 15 OU  Extraocular movements (EOMs): Full OU, no pain, no diplopia.  Confrontational Visual Field (CVF): Full OU.  Color Plates: 12/12 OU.    Pen Light Exam (PLE)  External: Normal OU.  Lids/Lashes/Lacrimal Ducts: Flat OU.  Sclera/Conjunctiva: White and quiet OU.  Cornea: Clear OU.  Anterior Chamber: Deep and formed OU.    Iris: Flat OU.  Lens: NS OU    Fundus Exam: dilated with 1% tropicamide and 2.5% phenylephrine  Approval obtained from primary team for dilation  Patient aware that pupils can remained dilated for at least 4-6 hours.  Exam performed with 20 D lens    Vitreous: floater OU  Disc, cup/disc: elevated optic nerve with blurred margins, raised vessels OD > OS, choroidal nevus about 1 disc diameter from disc OS  Macula: wnl OU  Vessels: wnl OU  Periphery: wnl OU

## 2022-10-08 NOTE — ED PROVIDER NOTE - PHYSICAL EXAMINATION
General: non-toxic, NAD  HEENT: NCAT, PERRL L afferent pupillary defect OD 20/20 OS 20/20 OU 20/20 no conjunctival injection.   Cardiac: RRR, no murmurs, 2+ peripheral pulses  Resp: CTAB  Abdomen: soft, non-distended, bowel sounds present, no ttp, no rebound or guarding. no organomegaly  Extremities: no peripheral edema, calf tenderness, or leg size discrepancies  Skin: no rashes  Neuro: AAOx4, 5+motor, sensation grossly intact CN 2-12 intact. no dysmetria. steady gait. negative romberg.   Psych: mood and affect appropriate

## 2022-10-08 NOTE — ED ADULT TRIAGE NOTE - CHIEF COMPLAINT QUOTE
Pt recently being treated for optic nerve swelling with Acetazolamide but due to side affects was discontinued on Tuesday. Pt presenting today c/o dizziness and head pressure. Pt noted to mildly hypertensive which pt states is abnormal for her. Denies blurry vision, neuro deficits.

## 2022-10-11 ENCOUNTER — EMERGENCY (EMERGENCY)
Facility: HOSPITAL | Age: 77
LOS: 1 days | Discharge: ANOTHER HOSPITAL | End: 2022-10-11
Attending: EMERGENCY MEDICINE | Admitting: EMERGENCY MEDICINE
Payer: MEDICARE

## 2022-10-11 ENCOUNTER — INPATIENT (INPATIENT)
Facility: HOSPITAL | Age: 77
LOS: 5 days | Discharge: HOME | End: 2022-10-17
Attending: STUDENT IN AN ORGANIZED HEALTH CARE EDUCATION/TRAINING PROGRAM | Admitting: STUDENT IN AN ORGANIZED HEALTH CARE EDUCATION/TRAINING PROGRAM

## 2022-10-11 VITALS
SYSTOLIC BLOOD PRESSURE: 137 MMHG | HEIGHT: 60 IN | TEMPERATURE: 98 F | RESPIRATION RATE: 18 BRPM | HEART RATE: 80 BPM | OXYGEN SATURATION: 99 % | DIASTOLIC BLOOD PRESSURE: 81 MMHG

## 2022-10-11 VITALS
RESPIRATION RATE: 18 BRPM | DIASTOLIC BLOOD PRESSURE: 85 MMHG | TEMPERATURE: 98 F | WEIGHT: 119.05 LBS | HEART RATE: 83 BPM | OXYGEN SATURATION: 97 % | SYSTOLIC BLOOD PRESSURE: 139 MMHG | HEIGHT: 60 IN

## 2022-10-11 DIAGNOSIS — Z87.898 PERSONAL HISTORY OF OTHER SPECIFIED CONDITIONS: ICD-10-CM

## 2022-10-11 LAB
ALBUMIN SERPL ELPH-MCNC: 4 G/DL — SIGNIFICANT CHANGE UP (ref 3.3–5)
ALP SERPL-CCNC: 62 U/L — SIGNIFICANT CHANGE UP (ref 40–120)
ALT FLD-CCNC: 18 U/L — SIGNIFICANT CHANGE UP (ref 12–78)
ANION GAP SERPL CALC-SCNC: 10 MMOL/L — SIGNIFICANT CHANGE UP (ref 5–17)
AST SERPL-CCNC: 13 U/L — LOW (ref 15–37)
BASOPHILS # BLD AUTO: 0.04 K/UL — SIGNIFICANT CHANGE UP (ref 0–0.2)
BASOPHILS NFR BLD AUTO: 0.6 % — SIGNIFICANT CHANGE UP (ref 0–2)
BILIRUB SERPL-MCNC: 0.6 MG/DL — SIGNIFICANT CHANGE UP (ref 0.2–1.2)
BUN SERPL-MCNC: 13 MG/DL — SIGNIFICANT CHANGE UP (ref 7–23)
CALCIUM SERPL-MCNC: 9.6 MG/DL — SIGNIFICANT CHANGE UP (ref 8.5–10.1)
CHLORIDE SERPL-SCNC: 107 MMOL/L — SIGNIFICANT CHANGE UP (ref 96–108)
CO2 SERPL-SCNC: 24 MMOL/L — SIGNIFICANT CHANGE UP (ref 22–31)
CREAT SERPL-MCNC: 0.91 MG/DL — SIGNIFICANT CHANGE UP (ref 0.5–1.3)
EGFR: 64 ML/MIN/1.73M2 — SIGNIFICANT CHANGE UP
EOSINOPHIL # BLD AUTO: 0.06 K/UL — SIGNIFICANT CHANGE UP (ref 0–0.5)
EOSINOPHIL NFR BLD AUTO: 0.9 % — SIGNIFICANT CHANGE UP (ref 0–6)
GLUCOSE SERPL-MCNC: 125 MG/DL — HIGH (ref 70–99)
HCT VFR BLD CALC: 41.8 % — SIGNIFICANT CHANGE UP (ref 34.5–45)
HGB BLD-MCNC: 13.4 G/DL — SIGNIFICANT CHANGE UP (ref 11.5–15.5)
IMM GRANULOCYTES NFR BLD AUTO: 0.3 % — SIGNIFICANT CHANGE UP (ref 0–0.9)
LYMPHOCYTES # BLD AUTO: 1.6 K/UL — SIGNIFICANT CHANGE UP (ref 1–3.3)
LYMPHOCYTES # BLD AUTO: 23.7 % — SIGNIFICANT CHANGE UP (ref 13–44)
MCHC RBC-ENTMCNC: 27.1 PG — SIGNIFICANT CHANGE UP (ref 27–34)
MCHC RBC-ENTMCNC: 32.1 GM/DL — SIGNIFICANT CHANGE UP (ref 32–36)
MCV RBC AUTO: 84.4 FL — SIGNIFICANT CHANGE UP (ref 80–100)
MONOCYTES # BLD AUTO: 0.75 K/UL — SIGNIFICANT CHANGE UP (ref 0–0.9)
MONOCYTES NFR BLD AUTO: 11.1 % — SIGNIFICANT CHANGE UP (ref 2–14)
NEUTROPHILS # BLD AUTO: 4.29 K/UL — SIGNIFICANT CHANGE UP (ref 1.8–7.4)
NEUTROPHILS NFR BLD AUTO: 63.4 % — SIGNIFICANT CHANGE UP (ref 43–77)
NRBC # BLD: 0 /100 WBCS — SIGNIFICANT CHANGE UP (ref 0–0)
PLATELET # BLD AUTO: 317 K/UL — SIGNIFICANT CHANGE UP (ref 150–400)
POTASSIUM SERPL-MCNC: 3.8 MMOL/L — SIGNIFICANT CHANGE UP (ref 3.5–5.3)
POTASSIUM SERPL-SCNC: 3.8 MMOL/L — SIGNIFICANT CHANGE UP (ref 3.5–5.3)
PROT SERPL-MCNC: 7.4 G/DL — SIGNIFICANT CHANGE UP (ref 6–8.3)
RBC # BLD: 4.95 M/UL — SIGNIFICANT CHANGE UP (ref 3.8–5.2)
RBC # FLD: 14 % — SIGNIFICANT CHANGE UP (ref 10.3–14.5)
SARS-COV-2 RNA SPEC QL NAA+PROBE: SIGNIFICANT CHANGE UP
SODIUM SERPL-SCNC: 141 MMOL/L — SIGNIFICANT CHANGE UP (ref 135–145)
WBC # BLD: 6.76 K/UL — SIGNIFICANT CHANGE UP (ref 3.8–10.5)
WBC # FLD AUTO: 6.76 K/UL — SIGNIFICANT CHANGE UP (ref 3.8–10.5)

## 2022-10-11 PROCEDURE — 93005 ELECTROCARDIOGRAM TRACING: CPT

## 2022-10-11 PROCEDURE — 71045 X-RAY EXAM CHEST 1 VIEW: CPT

## 2022-10-11 PROCEDURE — 85025 COMPLETE CBC W/AUTO DIFF WBC: CPT

## 2022-10-11 PROCEDURE — 99223 1ST HOSP IP/OBS HIGH 75: CPT

## 2022-10-11 PROCEDURE — 36415 COLL VENOUS BLD VENIPUNCTURE: CPT

## 2022-10-11 PROCEDURE — 99285 EMERGENCY DEPT VISIT HI MDM: CPT | Mod: 25

## 2022-10-11 PROCEDURE — 99284 EMERGENCY DEPT VISIT MOD MDM: CPT | Mod: GC

## 2022-10-11 PROCEDURE — 87635 SARS-COV-2 COVID-19 AMP PRB: CPT

## 2022-10-11 PROCEDURE — 71045 X-RAY EXAM CHEST 1 VIEW: CPT | Mod: 26

## 2022-10-11 PROCEDURE — 93010 ELECTROCARDIOGRAM REPORT: CPT

## 2022-10-11 PROCEDURE — 99285 EMERGENCY DEPT VISIT HI MDM: CPT

## 2022-10-11 PROCEDURE — 80053 COMPREHEN METABOLIC PANEL: CPT

## 2022-10-11 PROCEDURE — 70450 CT HEAD/BRAIN W/O DYE: CPT | Mod: 26

## 2022-10-11 RX ORDER — LATANOPROST 0.05 MG/ML
1 SOLUTION/ DROPS OPHTHALMIC; TOPICAL AT BEDTIME
Refills: 0 | Status: DISCONTINUED | OUTPATIENT
Start: 2022-10-11 | End: 2022-10-17

## 2022-10-11 RX ADMIN — LATANOPROST 1 DROP(S): 0.05 SOLUTION/ DROPS OPHTHALMIC; TOPICAL at 23:51

## 2022-10-11 NOTE — ED ADULT NURSE NOTE - CHIEF COMPLAINT QUOTE
Pt brought in by EMS from [place] for MRI of brain with sedation. Pt denies any complaints at this time.

## 2022-10-11 NOTE — H&P ADULT - NSHPPHYSICALEXAM_GEN_ALL_CORE
Vital Signs Last 24 Hrs  T(C): 36.8 (12 Oct 2022 01:46), Max: 37.1 (11 Oct 2022 20:13)  T(F): 98.3 (12 Oct 2022 01:46), Max: 98.7 (11 Oct 2022 20:13)  HR: 73 (12 Oct 2022 01:46) (73 - 83)  BP: 124/72 (12 Oct 2022 01:46) (117/81 - 139/85)  BP(mean): --  RR: 20 (12 Oct 2022 01:46) (18 - 20)  SpO2: 100% (12 Oct 2022 01:46) (97% - 100%)    PHYSICAL EXAM:  General: Awake and alert.  No acute distress.  Head: Normocephalic, atraumatic.    Eyes: PERRL.  EOMI.  No scleral icterus.  No conjunctival pallor.  Mouth: Moist MM.  No oropharyngeal exudates.    Neck: Supple.  Full range of motion.  No JVD.  No LAD.  No thyromegaly.  Trachea midline.    Heart: RRR.  Normal S1 and S2.  No murmurs, rubs, or gallops.  No LE edema b/l.   Lungs: Nonlabored breathing.  Good inspiratory effort.  CTAB.  No wheezes, crackles, or rhonchi.    Abdomen: BS+, soft, NT/ND.  No hepatomegaly.   Skin: Warm and dry.  No rashes.  Extremities: No cyanosis.  2+ peripheral pulses b/l.  Musculoskeletal: No joint deformities.  No spinal or paraspinal tenderness.  Neuro: A&Ox3.  CN II-XII intact.  5/5 motor strength in UE and LE b/l.  Tactile sensation intact in UE and LE b/l.  Cerebellar function intact as assessed by finger-to-nose test. Vital Signs Last 24 Hrs  T(C): 36.8 (12 Oct 2022 01:46), Max: 37.1 (11 Oct 2022 20:13)  T(F): 98.3 (12 Oct 2022 01:46), Max: 98.7 (11 Oct 2022 20:13)  HR: 73 (12 Oct 2022 01:46) (73 - 83)  BP: 124/72 (12 Oct 2022 01:46) (117/81 - 139/85)  BP(mean): --  RR: 20 (12 Oct 2022 01:46) (18 - 20)  SpO2: 100% (12 Oct 2022 01:46) (97% - 100%)    PHYSICAL EXAM:  General: Awake and alert.  No acute distress.  Head: Normocephalic, atraumatic.    Eyes: PERRL.  EOMI.  No scleral icterus.  No conjunctival pallor.  Mouth: Moist MM.  No oropharyngeal exudates.    Neck: Supple.  Full range of motion.  No JVD.  No LAD.  No thyromegaly.  Trachea midline.    Heart: RRR.  Normal S1 and S2.  No murmurs, rubs, or gallops.  No LE edema b/l.   Lungs: Nonlabored breathing.  Good inspiratory effort.  CTAB.  No wheezes, crackles, or rhonchi.    Abdomen: BS+, soft, nontender with no rebound or guarding, nondistended.  No hepatomegaly.   Skin: Warm and dry.  No rashes.  Extremities: No cyanosis.  2+ peripheral pulses b/l.  Musculoskeletal: No joint deformities.  No spinal or paraspinal tenderness.  Neuro: A&Ox3.  CN II-XII intact.  5/5 motor strength in UE and LE b/l.  Tactile sensation intact in UE and LE b/l.  No dysmetria as tested by finger-to-nose test.  Psychiatric: Pleasant, intermittently mildly anxious.  Full affect.  No SI/HI.

## 2022-10-11 NOTE — ED PROVIDER NOTE - CLINICAL SUMMARY MEDICAL DECISION MAKING FREE TEXT BOX
Patient is a 77y F hypothyroid, papilledema (dx 2020, treated with acetazolamide), p/w head pressure. Patient states her papilledema resolved, symptoms returned in september. Patient without new neuro changes. Symptoms consistent with prior. Will call ophtho, admit for MRI.

## 2022-10-11 NOTE — ED ADULT NURSE NOTE - OBJECTIVE STATEMENT
Patient to the ED with c/o of head pressure, blurring vision and dizziness. The patient is requesting an MRI.  The patient was treated at [facility] 3 days ago and signed AMA form after she believed the MRI would take too long. The patient has a hx of hypothyroid and  papilledema. The patient denies SOB, chest pain, numbness, tingling or change in gait. The patient stated " something happened and I just do not feel right ". ELISABET ZEPEDA

## 2022-10-11 NOTE — ED PROVIDER NOTE - OBJECTIVE STATEMENT
Patient is a 77y F hypothyroid, papilledema (dx 2020, treated with acetazolamide), p/w head pressure. Patient states her papilledema resolved, symptoms returned in september. Now with head pressure, one episode blurry vision (resolved), and occasional unsteadiness. Patient was seen in [facility] ED a few days ago for same symptoms and left AMA to schedule own outpatient MRI. Patient then found out she could not do sedation at outpatient MRI so returned to hospital for admission. Denies CP, SOB, vomiting, numbness.

## 2022-10-11 NOTE — ED PROVIDER NOTE - CARE PLAN
1 Principal Discharge DX:	History of headache   Principal Discharge DX:	History of headache  Secondary Diagnosis:	Papilledema, both eyes

## 2022-10-11 NOTE — ED ADULT TRIAGE NOTE - CHIEF COMPLAINT QUOTE
Pt brought in by EMS from [place] for MRI of brain with sedation. Pt denies any complaints at this time.

## 2022-10-11 NOTE — H&P ADULT - ASSESSMENT
78 yo woman with history of hypothyroidism, papilledema (initially dx in 2020), and osteoporosis presents with 1 month of colored floaters, head pressure, and off-balance sensation, worse over the past 1 week, admitted for further workup of papilledema.

## 2022-10-11 NOTE — ED ADULT NURSE REASSESSMENT NOTE - NS ED NURSE REASSESS COMMENT FT1
Received pt from previous RN in bed awake and alert, breathing with ease on Room air, calm, denies pain at this time, awaiting scan/MRI. in NAD at this time. currently in CT scan.

## 2022-10-11 NOTE — ED PROVIDER NOTE - CLINICAL SUMMARY MEDICAL DECISION MAKING FREE TEXT BOX
78yo F with papilledema p/w headaches x 1 month, intermittent, under care of neuro-ophtho, requires admission for MRI Brain and Orbits. pt requires sedation secondary to high levels of anxiety, labs, cxr, ekg, admit. papilledema
No COVID test required

## 2022-10-11 NOTE — H&P ADULT - HISTORY OF PRESENT ILLNESS
76 yo woman with history of hypothyroidism and papilledema (initially dx in 2020) presents with 1 month of colored floaters, head pressure, and off-balance sensation, worse over the past 1-2 weeks. Pt states that she was initially diagnosed with papilledema back in 2020, with R eye worse than L eye, and was started on latanoprost eye drops. Pt notes that at that time, her only symptom was intermittent colored floaters, which eventually went away.  76 yo woman with history of hypothyroidism and papilledema (initially dx in 2020) presents with 1 month of colored floaters, head pressure, and off-balance sensation, worse over the past 1 week. Pt states that she was initially diagnosed with papilledema back in 2020, with R eye worse than L eye, and was started on latanoprost eye drops. Pt notes that at the time, her only symptom was intermittent colored floaters, which eventually went away. A month ago, however, pt started having colored floaters again, mainly on the R side of her R eye, prompting her to go see her neuro-ophthalmologist, Dr. Peg Cunningham, who diagnosed pt with recurrence of papilledema and started her on acetazolamide 250 mg once daily. Since September, pt has also been experiencing head pressure, felt most intensely on the top of her head, worse when upright, improved with lying flat, and an off-balance sensation, described as feeling like she's on a rocking boat. No associated vision loss, tinnitus, neck pain, limb weakness, falls, or syncope. The colored floaters, head pressure, and off-balance sensation have become more severe during the last week after pt discontinued her acetazolamide due to side effects. Pt reports that she took acetazolamide as instructed for ~3.5 weeks but had to stop, as she was getting tingling in her hands, a metallic taste in her mouth, and muscle cramps. Pt reports that she had been walking 10,000 steps daily for the past 2 years but hasn't been able to do so for the last week because of her worsening symptoms, also had a 5-minute episode of double vision accompanied by nausea, though without vomiting, last week. Pt started taking Dramamine for her off-balance sensation, getting some relief, as she thinks it helps take the edge off.    Of note, pt came to [facility] ED 3 days ago (10/8/22) after being referred by her neuro-ophthalmologist for further workup of her current symptoms and was evaluated by ophthalmology, who recommended an MRI head and orbits w/wo contrast and MRV head w/wo contrast. Pt, however, left AMA 76 yo woman with history of hypothyroidism and papilledema (initially dx in 2020) presents with 1 month of colored floaters, head pressure, and off-balance sensation, worse over the past 1 week. Pt states that she was initially diagnosed with papilledema back in 2020, with R eye worse than L eye, and was started on latanoprost eye drops. Pt notes that at the time, her only symptom was intermittent colored floaters, which eventually went away. A month ago, however, pt started having colored floaters again, mainly on the R side of her R eye, prompting her to go see her neuro-ophthalmologist, Dr. Peg Cunningham, who diagnosed pt with recurrence of papilledema and started her on acetazolamide 250 mg once daily. Since September, pt has also been experiencing head pressure, felt most intensely on the top of her head, worse when upright, improved with lying flat, and an off-balance sensation, described as feeling like she's on a rocking boat. No associated vision loss, tinnitus, neck pain, limb weakness, falls, or syncope. The colored floaters, head pressure, and off-balance sensation have become more severe during the last week after pt discontinued her acetazolamide due to side effects. Pt reports that she took acetazolamide as instructed for ~3.5 weeks but had to stop, as she was getting tingling in her hands, a metallic taste in her mouth, and muscle cramps. Pt reports that she had been walking 10,000 steps daily for the past 2 years but hasn't been able to do so for the last week because of her worsening symptoms, also had a 5-minute episode of double vision accompanied by nausea, though without vomiting, last week. Pt started taking Dramamine for her off-balance sensation, getting some relief, as she thinks it helps take the edge off.    Of note, pt came to [facility] ED 3 days ago (10/8/22) after being referred by her neuro-ophthalmologist for further workup of her current symptoms and was evaluated by ophthalmology, who recommended an MRI head and orbits w/wo contrast and MRV head w/wo contrast. Pt, however, left AMA from the ED due to anxiety preventing her from being able to handle a multiple-day hospital admission, with plan to get an MRI/MRV at an outpatient center where she has gotten MRIs in the past under sedation for severe claustrophobia.  78 yo woman with history of hypothyroidism and papilledema (initially dx in 2020) presents with 1 month of colored floaters, head pressure, and off-balance sensation, worse over the past 1 week. Pt states that she was initially diagnosed with papilledema back in 2020, with R eye worse than L eye, and was started on latanoprost eye drops. Pt notes that at the time, her only symptom was intermittent colored floaters, which eventually went away. A month ago, however, pt started having colored floaters again, mainly on the R side of her R eye, prompting her to go see her neuro-ophthalmologist, Dr. Peg Cunningham, who diagnosed pt with recurrence of papilledema and started her on acetazolamide 250 mg once daily. Since September, pt has also been experiencing head pressure, felt most intensely on the top of her head, worse when upright, improved with lying flat, and an off-balance sensation, described as feeling like she's on a rocking boat. No associated vision loss, tinnitus, neck pain, limb weakness, or falls. The colored floaters, head pressure, and off-balance sensation have become more severe during the last week after pt discontinued her acetazolamide due to side effects. Pt reports that she took acetazolamide as instructed for ~3.5 weeks but had to stop, as she was getting tingling in her hands, a metallic taste in her mouth, and muscle cramps, now resolved since discontinuation. Pt reports that she had been walking 10,000 steps daily for the past 2 years but hasn't been able to do so for the last week because of her worsening symptoms, also had a 5-minute episode of double vision accompanied by nausea, though without vomiting, last week. Pt started taking Dramamine for her off-balance sensation, getting some relief, as she thinks it helps take the edge off.    Of note, pt came to [facility] ED 3 days ago (10/8/22) after being referred by her neuro-ophthalmologist for further workup of her current symptoms and was evaluated by ophthalmology, who recommended an MRI head and orbits w/wo contrast and MRV head w/wo contrast. Pt, however, left AMA from the ED due to anxiety preventing her from being able to handle a multiple-day hospital admission. Pt, though, went to [place] ED today after learning that she would not be able to get an outpatient MRI under sedation, which she needs for her severe claustrophobia, and was subsequently transferred to [facility] ED.    Pt denies any fevers, chills, chest pain, shortness of breath, palpitations, abdominal pain, vomiting, diarrhea, constipation, melena, BRBPR, urinary symptoms, or weight loss. Pt reports that for the past month or so, she has been getting faint/lightheaded after walking up a flight of stairs. No syncope, however.    In the ED,  T 97.7-98.7, HR 77-81, -137/81-88, RR 18, SpO2 % RA.

## 2022-10-11 NOTE — ED PROVIDER NOTE - OBJECTIVE STATEMENT
78yo F with hypothyroid and papilledema diagnosed 2020 with recurrent symptoms past month, treated by neuro-ophtho with acetazolamide. pt to [facility] ED 10/8, pt with high anxiety left because did not want to wait for MRI. pt sent to [place] for MRI brain and orbits with and without contrast by Dr. sheryl peters.   [phone].  pt st genralized pressure like headache on and off, but states due to her anxiety. pt st takes dramamine for anxiety. pt with sister at bedside, st no speech, gait, vision changes,. no weakness, numbness, tingling, abd pain, n/v/d, fever, chills, cp, sob, cough  pcp=alyse landers

## 2022-10-11 NOTE — ED PROVIDER NOTE - PROGRESS NOTE DETAILS
Karolyn Soria MD PGY2: Patient TBA hospitalist. Paged ophtho. Awaiting call back at this time. Case discussed with hospitalist.

## 2022-10-11 NOTE — ED PROVIDER NOTE - PHYSICAL EXAMINATION
Gen: Awake, Alert, WD, WN, NAD  Head:  NC/AT  Eyes:  PERRL, EOMI, Conjunctiva pink, lids normal, no scleral icterus  ENT: OP clear, no exudates, no erythema, uvula midline, TMs clear bilaterally, moist mucus membranes  Neck: supple, nontender, no meningismus, no JVD, trachea midline  Cardiac/CV:  S1 S2, RRR, no M/G/R  Chest: nontender, no crepitus  Respiratory/Pulm:  CTAB, good air movement, normal resp effort, no wheezes/stridor/retractions/rales/rhonchi  Gastrointestinal/Abdomen:  Soft, nontender, nondistended, +BS, no rebound/guarding  Pelvis: stable, nontender, Hips: FROM, nontender  Back:  no CVAT, no MLT  Ext:  warm, well perfused, moving all extremities spontaneously, no cyanosis, no erythema, no edema, distal pulses intact  Skin: intact, no rash, no vesicles, no petechiae, no ecchymosis  Neuro:  AAOx3, sensation intact, motor 5/5 x 4 extremities, normal gait, speech clear, FTNT normal.

## 2022-10-11 NOTE — ED PROVIDER NOTE - PHYSICAL EXAMINATION
GENERAL: no acute distress, non-toxic appearing  HEAD: normocephalic, atraumatic  HEENT: PERRLA, EOMI, horizontal nystagmus  CARDIAC: regular rate and rhythm  PULM: clear to ascultation bilaterally  GI: abdomen nondistended, soft, nontender  NEURO: alert and oriented x 3, normal speech, no gross neurologic deficit  MSK: no visible deformities  SKIN: no visible rashes, dry, well-perfused  PSYCH: appropriate mood and affect

## 2022-10-11 NOTE — H&P ADULT - PROBLEM SELECTOR PLAN 2
Pt with 1 month of head pressure associated with colored floaters and off-balance sensation. Likely 2/2 increased intracranial pressure.  - Plan as above for papilledema

## 2022-10-11 NOTE — H&P ADULT - NSHPLABSRESULTS_GEN_ALL_CORE
show EKG personally reviewed.    Labs personally reviewed.    Imaging personally reviewed. EKG personally reviewed.  NSR at 74 bpm, concordant TWI in III, no STD or CHITO, QTc 437 ms.    Labs personally reviewed.                        13.4   6.76  )-----------( 317      ( 11 Oct 2022 09:55 )             41.8     10-11    141  |  107  |  13  ----------------------------<  125<H>  3.8   |  24  |  0.91    Ca    9.6      11 Oct 2022 09:55    TPro  7.4  /  Alb  4.0  /  TBili  0.6  /  DBili  x   /  AST  13<L>  /  ALT  18  /  AlkPhos  62  10-11    Imaging personally reviewed.  CXR performed at [facility] on 10/11/22 with no focal consolidations/opacities based on my interpretation.    CTH noncontrast performed but no prelim or official read.

## 2022-10-11 NOTE — ED PROVIDER NOTE - PROGRESS NOTE DETAILS
call to [facility], aware of pt status, need for MRI with sedation, ophtho.  will call back d/w Dr. Baer ED attending ANTWAN, aware of pt status, accepts pt for transfer

## 2022-10-11 NOTE — ED ADULT NURSE NOTE - CHIEF COMPLAINT QUOTE
Pt ambulatory to triage stating she was sent by neurologist Dr Cunningham for swelling to optic nerve with worsening symptoms requiring an MRI.   Pt states she has a hx of this 2 years ago which resolved and was dx again 1 month ago.   Pt states for the last two weeks she has been feeling "off balance" while walking with visual changes/color changes and pressure to the top of her head.   Pt BALBUENA with equal and adequate strength, no facial asymmetry noted, +VINH.   Pt denies visual changes at this time.  Denies dizziness/paresthesias, no acute neuro deficits.   Pt appears highly anxious and admits this is her baseline. States she was here two days ago and left when she coundlt have the MRI immediately. BN

## 2022-10-11 NOTE — H&P ADULT - NSHPREVIEWOFSYSTEMS_GEN_ALL_CORE
Constitutional: No generalized weakness, fevers, chills, or weight loss  Eyes: +Colored floaters. +Single episode of double vision last week. No vision loss or blurry vision.  Ears, Nose, Mouth, Throat: No runny nose, sinus pain, ear pain, tinnitus, sore throat, dysphagia, or odynophagia  Cardiovascular: No chest pain, palpitations, or LE edema  Respiratory: No cough, wheezing, hemoptysis, or shortness of breath  Gastrointestinal: +Single episode of nausea last week. No abdominal pain, vomiting, diarrhea, constipation, hematemesis, melena, or BRBPR.  Genitourinary: No dysuria, frequency, urgency, or hematuria  Musculoskeletal: No back pain or joint pain, swelling, or decreased ROM  Skin: No pruritus or rashes  Neurologic: +Head pressure. +Off-balance sensation. +Faintness/lightheadedness after walking up a flight of stairs. No seizures, paresthesias, numbness, or limb weakness.  Psychiatric: +Anxiety. +Claustrophobia. No depression or SI/HI.  Endocrine: No heat/cold intolerance, mood swings, sweats, polydipsia, or polyuria  Hematologic/lymphatic: No purpura, petechia, or prolonged or excessive bleeding after dental extraction / injury  Allergic/Immunologic: +H/o allergic reaction (hives) to PCN    Positives and pertinent negatives noted and all other systems negative.

## 2022-10-11 NOTE — ED PROVIDER NOTE - ATTENDING CONTRIBUTION TO CARE
I have personally performed a face to face medical and diagnostic evaluation of the patient. I have discussed with and reviewed the Resident's note and agree with the History, ROS, Physical Exam and MDM unless otherwise indicated. A brief summary of my personal evaluation and impression can be found below.    David SALDANA: 77F hx of hypothyroid, papilledema dx in 2020 with neg MRI placed on latanoprost gtt, now with recurrence of papilledema. saw floaters so went to neuro-ophthalmologist. started on acetazolamide 250mg 2w ago stopped on 10/5. Pt well known to me, seen by me a few days prior was offered admission for MRI in setting of papilledema and eval by optho, however AMA'd 2/2 her anxiety and unwillingness to stay at that time for MRI, she returns today as a transfer from OSH after trying to arrange an outpt MRI with sedation. Pt reports is having intermittent lightheadedness and dizziness that feels c/w prior episodes and flares of her headache.    All other ROS negative, except as above and as per HPI and ROS section.    VITALS: Initial triage and subsequent vitals have been reviewed by me.  GEN APPEARANCE: WDWN, alert, non-toxic, NAD  HEAD: Atraumatic.  EYES: PERRLa, EOMI, vision grossly intact.   NECK: Supple  CV: RRR, S1S2, no c/r/m/g. Cap refill <2 seconds. No bruits.   LUNGS: CTAB. No abnormal breath sounds.  ABDOMEN: Soft, NTND. No guarding or rebound.   MSK/EXT: No spinal or extremity point tenderness. No CVA ttp. Pelvis stable. No peripheral edema.  NEURO: Alert, follows commands. Weight bearing normal. Speech normal. Sensation and motor normal x4 extremities.   SKIN: Warm, dry and intact. No rash.  PSYCH: Appropriate    Plan/MDM: David SALDANA: 77F hx of hypothyroid, papilledema dx in 2020 with neg MRI placed on latanoprost gtt, now with recurrence of papilledema. saw floaters so went to neuro-ophthalmologist. started on acetazolamide 250mg 2w ago stopped on 10/5. Pt well known to me, seen by me a few days prior was offered admission for MRI in setting of papilledema and eval by optho, pt now willing to stay for MRI, exam vss non toxic PE as above, neuro exam grossly reassuring, will check basic labs, arrange for admission for MRI.

## 2022-10-11 NOTE — H&P ADULT - PROBLEM SELECTOR PLAN 3
Pt reporting getting faint/lightheaded after walking up a flight of stairs, onset ~1 month ago. No episodes of syncope, however. Can be in setting of increased intracranial pressure, though orthostatic hypotension, vasovagal syndrome, or cardiac etiology.  - Plan as above for papilledema  - Check orthostatics  - TTE ordered to evaluate for structural heart disease (heart failure/valvular dysfunction)  - Fall risk protocol  - PT eval

## 2022-10-11 NOTE — ED ADULT TRIAGE NOTE - CHIEF COMPLAINT QUOTE
Pt ambulatory to triage stating she was sent by MD for swelling to optic nerve with worsening symptoms requiring an MRI.   Pt states she has a hx of this 2 years ago which resolved and was dx again 1 month ago.   Pt states for the last two weeks she has been feeling "off balance" while walking with visual changes/color changes and pressure to the top of her head.   Pt BALBUENA with equal and adequate strength, no facial asymmetry noted, +VINH.   Pt denies visual changes at this time.  Denies dizziness/paresthesias, no acute neuro deficits.   Pt appears highly anxious and admits this is her baseline. States she was here two days ago and left when she coundlt have the MRI immediately. BN Pt ambulatory to triage stating she was sent by neurologist Dr Cunningham for swelling to optic nerve with worsening symptoms requiring an MRI.   Pt states she has a hx of this 2 years ago which resolved and was dx again 1 month ago.   Pt states for the last two weeks she has been feeling "off balance" while walking with visual changes/color changes and pressure to the top of her head.   Pt BALBUENA with equal and adequate strength, no facial asymmetry noted, +VINH.   Pt denies visual changes at this time.  Denies dizziness/paresthesias, no acute neuro deficits.   Pt appears highly anxious and admits this is her baseline. States she was here two days ago and left when she coundlt have the MRI immediately. BN

## 2022-10-11 NOTE — H&P ADULT - PROBLEM SELECTOR PLAN 1
Pt with 1 month of colored floaters, head pressure, and off-balance sensation, now with worsening symptoms over the past 1 week after discontinuing acetazolamide. Pt found to have recurrence of papilledema.  - Pt evaluated by ophthalmology on Saturday, 10/8/22. Will re-consult ophthalmology in AM.   - Neurology consult placed overnight. Will Pt with 1 month of colored floaters, head pressure, and off-balance sensation, now with worsening symptoms over the past 1 week after discontinuing acetazolamide. Pt found to have recurrence of papilledema, unclear etiology.  - Pt evaluated by ophthalmology on Saturday, 10/8/22. Will re-consult ophthalmology in AM.   - Neurology consult placed overnight. Case also discussed with Neurology resident, who recommends MRI head and orbits w/wo contrast and MRV head w/wo contrast and possible LP.  - MRI head and orbits w/wo contrast and MRV head w/wo contrast ordered  - CTH noncontrast ordered and performed. F/u official read.  - C/w latanoprost eye drops  - Neuro checks q4hrs  - PO Tylenol PRN for head pressure Pt with 1 month of colored floaters, head pressure, and off-balance sensation, now with worsening symptoms over the past 1 week after discontinuing acetazolamide. Pt found to have recurrence of papilledema, unclear etiology.  - Pt evaluated by ophthalmology on Saturday, 10/8/22. Will re-consult ophthalmology in AM.   - Neurology consult placed overnight. Case also discussed with Neurology resident, who recommends MRI head and orbits w/wo contrast and MRV head w/wo contrast and possible LP.   - MRI head and orbits w/wo contrast and MRV head w/wo contrast ordered. Will need to coordinate with anesthesia to provide sedation, will consult in AM.  - CTH noncontrast ordered and performed. F/u official read.  - C/w latanoprost eye drops  - Neuro checks q4hrs  - PO Tylenol PRN for head pressure

## 2022-10-12 DIAGNOSIS — Z90.89 ACQUIRED ABSENCE OF OTHER ORGANS: Chronic | ICD-10-CM

## 2022-10-12 DIAGNOSIS — Z29.9 ENCOUNTER FOR PROPHYLACTIC MEASURES, UNSPECIFIED: ICD-10-CM

## 2022-10-12 DIAGNOSIS — R51.9 HEADACHE, UNSPECIFIED: ICD-10-CM

## 2022-10-12 DIAGNOSIS — R55 SYNCOPE AND COLLAPSE: ICD-10-CM

## 2022-10-12 DIAGNOSIS — E03.9 HYPOTHYROIDISM, UNSPECIFIED: ICD-10-CM

## 2022-10-12 DIAGNOSIS — H47.10 UNSPECIFIED PAPILLEDEMA: ICD-10-CM

## 2022-10-12 LAB
ANION GAP SERPL CALC-SCNC: 13 MMOL/L — SIGNIFICANT CHANGE UP (ref 7–14)
APTT BLD: 27.6 SEC — SIGNIFICANT CHANGE UP (ref 27–36.3)
BUN SERPL-MCNC: 13 MG/DL — SIGNIFICANT CHANGE UP (ref 7–23)
CALCIUM SERPL-MCNC: 9.8 MG/DL — SIGNIFICANT CHANGE UP (ref 8.4–10.5)
CHLORIDE SERPL-SCNC: 102 MMOL/L — SIGNIFICANT CHANGE UP (ref 98–107)
CO2 SERPL-SCNC: 22 MMOL/L — SIGNIFICANT CHANGE UP (ref 22–31)
CREAT SERPL-MCNC: 0.69 MG/DL — SIGNIFICANT CHANGE UP (ref 0.5–1.3)
EGFR: 88 ML/MIN/1.73M2 — SIGNIFICANT CHANGE UP
GLUCOSE SERPL-MCNC: 101 MG/DL — HIGH (ref 70–99)
HCT VFR BLD CALC: 41.9 % — SIGNIFICANT CHANGE UP (ref 34.5–45)
HGB BLD-MCNC: 13.5 G/DL — SIGNIFICANT CHANGE UP (ref 11.5–15.5)
INR BLD: 1.01 RATIO — SIGNIFICANT CHANGE UP (ref 0.88–1.16)
MAGNESIUM SERPL-MCNC: 2.1 MG/DL — SIGNIFICANT CHANGE UP (ref 1.6–2.6)
MCHC RBC-ENTMCNC: 26.6 PG — LOW (ref 27–34)
MCHC RBC-ENTMCNC: 32.2 GM/DL — SIGNIFICANT CHANGE UP (ref 32–36)
MCV RBC AUTO: 82.6 FL — SIGNIFICANT CHANGE UP (ref 80–100)
NRBC # BLD: 0 /100 WBCS — SIGNIFICANT CHANGE UP (ref 0–0)
NRBC # FLD: 0 K/UL — SIGNIFICANT CHANGE UP (ref 0–0)
PHOSPHATE SERPL-MCNC: 3.4 MG/DL — SIGNIFICANT CHANGE UP (ref 2.5–4.5)
PLATELET # BLD AUTO: 309 K/UL — SIGNIFICANT CHANGE UP (ref 150–400)
POTASSIUM SERPL-MCNC: 4 MMOL/L — SIGNIFICANT CHANGE UP (ref 3.5–5.3)
POTASSIUM SERPL-SCNC: 4 MMOL/L — SIGNIFICANT CHANGE UP (ref 3.5–5.3)
PROTHROM AB SERPL-ACNC: 11.7 SEC — SIGNIFICANT CHANGE UP (ref 10.5–13.4)
RBC # BLD: 5.07 M/UL — SIGNIFICANT CHANGE UP (ref 3.8–5.2)
RBC # FLD: 14.2 % — SIGNIFICANT CHANGE UP (ref 10.3–14.5)
SODIUM SERPL-SCNC: 137 MMOL/L — SIGNIFICANT CHANGE UP (ref 135–145)
TSH SERPL-MCNC: 5.89 UIU/ML — HIGH (ref 0.27–4.2)
WBC # BLD: 7.14 K/UL — SIGNIFICANT CHANGE UP (ref 3.8–10.5)
WBC # FLD AUTO: 7.14 K/UL — SIGNIFICANT CHANGE UP (ref 3.8–10.5)

## 2022-10-12 PROCEDURE — 99232 SBSQ HOSP IP/OBS MODERATE 35: CPT | Mod: GC

## 2022-10-12 PROCEDURE — 99223 1ST HOSP IP/OBS HIGH 75: CPT

## 2022-10-12 PROCEDURE — 99233 SBSQ HOSP IP/OBS HIGH 50: CPT

## 2022-10-12 RX ORDER — LEVOTHYROXINE SODIUM 125 MCG
88 TABLET ORAL DAILY
Refills: 0 | Status: DISCONTINUED | OUTPATIENT
Start: 2022-10-12 | End: 2022-10-17

## 2022-10-12 RX ORDER — ACETAZOLAMIDE 250 MG/1
250 TABLET ORAL DAILY
Refills: 0 | Status: DISCONTINUED | OUTPATIENT
Start: 2022-10-12 | End: 2022-10-14

## 2022-10-12 RX ORDER — LANOLIN ALCOHOL/MO/W.PET/CERES
3 CREAM (GRAM) TOPICAL AT BEDTIME
Refills: 0 | Status: DISCONTINUED | OUTPATIENT
Start: 2022-10-12 | End: 2022-10-17

## 2022-10-12 RX ORDER — ENOXAPARIN SODIUM 100 MG/ML
40 INJECTION SUBCUTANEOUS EVERY 24 HOURS
Refills: 0 | Status: DISCONTINUED | OUTPATIENT
Start: 2022-10-12 | End: 2022-10-14

## 2022-10-12 RX ADMIN — Medication 88 MICROGRAM(S): at 05:53

## 2022-10-12 RX ADMIN — ENOXAPARIN SODIUM 40 MILLIGRAM(S): 100 INJECTION SUBCUTANEOUS at 18:31

## 2022-10-12 RX ADMIN — LATANOPROST 1 DROP(S): 0.05 SOLUTION/ DROPS OPHTHALMIC; TOPICAL at 21:50

## 2022-10-12 NOTE — CONSULT NOTE ADULT - SUBJECTIVE AND OBJECTIVE BOX
HPI:  78 yo woman with history of hypothyroidism and papilledema (initially dx in 2020) presents with 1 month of colored floaters, head pressure (worse when standing, relieved by laying down), and off-balance sensation, worse over the past 1 week. Pt states that she was initially diagnosed with papilledema back in 2020, with R eye worse than L eye, and was started on latanoprost eye drops. Pt notes that at that time, her only symptom was intermittent colored floaters, which eventually went away. A month ago, however, pt started having colored floaters again, mainly on the R side of her R eye, prompting her to go see her neuro-ophthalmologist, Dr. Peg Cunningham, who diagnosed pt with recurrence of papilledema and started her on acetazolamide 250 mg once daily, which she discontinued last week due to side effects ( tingling in her hands, a metallic taste in her mouth, and muscle cramps, now resolved since discontinuation) . No associated vision loss, tinnitus, 5-minute episode of double vision accompanied by nausea last week.  Pt reports trouble walking in the past month due to off balance sensation for which she started taking Dramamine, getting some relief.    Of note, pt came to [facility] ED 3 days ago (10/8/22) after being referred by her neuro-ophthalmologist for further workup of her current symptoms and was evaluated by ophthalmology, who recommended an MRI head and orbits w/wo contrast and MRV head w/wo contrast. Pt, however, left AMA from the ED due to anxiety preventing her from being able to handle a multiple-day hospital admission. Pt, though, went to [facility] ED today after learning that she would not be able to get an outpatient MRI under sedation, which she needs for her severe claustrophobia, and was subsequently transferred to [facility] ED.    PAST MEDICAL & SURGICAL HISTORY:  Hypothyroidism      Papilledema      History of tonsillectomy        FAMILY HISTORY:  Family history of diabetes mellitus (Father)        SOCIAL HISTORY: SOCIAL HISTORY:     Marital Status: (  )   (  ) Single  (  )   (  )      Occupation:      Lives: (  ) alone  (  ) with children   (  ) with spouse  (  ) with parents  (  ) other     Illicit Drug Use: (  ) never used  (  ) other _____     Tobacco Use:  (  ) never smoked  (  ) former smoker  (  ) current smoker  (  ) pack year  (  ) last cigarette date     Alcohol Use:      Sexual History:        MEDICATIONS  Home Medications:  latanoprost 0.005% ophthalmic solution: 1 drop(s) to each affected eye once a day (in the evening) (11 Oct 2022 22:54)  levothyroxine 88 mcg (0.088 mg) oral tablet: 1 tab(s) orally once a day (11 Oct 2022 22:54)      MEDICATIONS  (STANDING):  latanoprost 0.005% Ophthalmic Solution 1 Drop(s) Both EYES at bedtime    MEDICATIONS  (PRN):      ALLERGIES/INTOLERANCES:  Allergies  penicillin (Hives)    Intolerances      OBJECTIVE:  VITALS   Vital Signs Last 24 Hrs  T(C): 36.8 (12 Oct 2022 01:46), Max: 37.1 (11 Oct 2022 20:13)  T(F): 98.3 (12 Oct 2022 01:46), Max: 98.7 (11 Oct 2022 20:13)  HR: 73 (12 Oct 2022 01:46) (73 - 83)  BP: 124/72 (12 Oct 2022 01:46) (117/81 - 139/85)  BP(mean): --  RR: 20 (12 Oct 2022 01:46) (18 - 20)  SpO2: 100% (12 Oct 2022 01:46) (97% - 100%)    Parameters below as of 12 Oct 2022 01:46  Patient On (Oxygen Delivery Method): room air        PHYSICAL EXAM:  Neurological Exam:  MS: Awake, alert, oriented to person, place, month, year, president. Normal affect. Follows all commands.    Language: Speech is clear, fluent with good repetition & comprehension (able to name objects___)    CNs: PERRLA (R = 3mm, L = 3mm). VFF. EOMI no nystagmus, no diplopia. V1-3 intact to LT/pinprick, well developed masseter muscles b/l. No facial asymmetry b/l, Hearing grossly normal (rubbing fingers) b/l. Symmetric palate elevation in midline. Gag reflex deferred. Head turning & shoulder shrug intact b/l. Tongue midline, normal movements, no atrophy.    Fundoscopic: pale w/ sharp discs margins No vascular changes.      Motor: Normal muscle bulk & tone. No noticeable tremor or seizure.               Deltoid	Biceps	Triceps 	   R	5	5	5	5	 	  L	5	5	5	5			    	H-Flex	H-Ext	K-Flex	K-Ext	D-Flex	P-Flex  R	5	5	5	5	5	5		   L	5	5	5	5	5	5		     Sensation: Intact to LT/PP/Temp/Vibration/Position b/l throughout.     Cortical: Extinction on DSS (neglect): none    Reflexes:              Biceps(C5)       BR(C6)     Triceps(C7)               Patellar(L4)    Achilles(S1)    Plantar Resp  R	2	          2	             2		        2		    2		Down   L	2	          2	             2		        2		    2		Down     Coordination: intact rapid-alt movements. No dysmetria to FTN/HTS    Gait:  Normal Romberg. No postural instability. Normal stance and tandem gait.     LABORATORY:  CBC                       13.4   6.76  )-----------( 317      ( 11 Oct 2022 09:55 )             41.8     Chem 10-11    141  |  107  |  13  ----------------------------<  125<H>  3.8   |  24  |  0.91    Ca    9.6      11 Oct 2022 09:55    TPro  7.4  /  Alb  4.0  /  TBili  0.6  /  DBili  x   /  AST  13<L>  /  ALT  18  /  AlkPhos  62  10-11    LFTs LIVER FUNCTIONS - ( 11 Oct 2022 09:55 )  Alb: 4.0 g/dL / Pro: 7.4 g/dL / ALK PHOS: 62 U/L / ALT: 18 U/L / AST: 13 U/L / GGT: x           Coagulopathy   Lipid Panel   A1c   Cardiac enzymes     U/A   CSF  Immunological  Other    STUDIES & IMAGING:  Studies (EKG, EEG, EMG, etc):     Radiology (XR, CT, MR, U/S, TTE/RAYMON): HPI:  78 yo woman with history of hypothyroidism and papilledema (initially dx in 2020) presents with 1 month of colored floaters, head pressure (worse when standing, relieved by laying down), and off-balance sensation, worse over the past 1 week. Pt states that she was initially diagnosed with papilledema back in 2020, with R eye worse than L eye, and was started on latanoprost eye drops. Pt notes that at that time, her only symptom was intermittent colored floaters, which eventually went away. A month ago, however, pt started having colored floaters again, mainly on the R side of her R eye, prompting her to go see her neuro-ophthalmologist, Dr. Peg Cunningham, who diagnosed pt with recurrence of papilledema and started her on acetazolamide 250 mg once daily, which she discontinued last week due to side effects ( tingling in her hands, a metallic taste in her mouth, and muscle cramps, now resolved since discontinuation) . No associated vision loss, tinnitus, 5-minute episode of double vision accompanied by nausea last week.  Pt reports trouble walking in the past month due to off balance sensation 2/2 head pressure for which she started taking Dramamine, getting some relief.    Of note, pt came to [facility] ED 3 days ago (10/8/22) after being referred by her neuro-ophthalmologist for further workup of her current symptoms and was evaluated by ophthalmology, who recommended an MRI head and orbits w/wo contrast and MRV head w/wo contrast. Pt, however, left AMA from the ED due to anxiety preventing her from being able to handle a multiple-day hospital admission. Pt, though, went to [facility] ED today after learning that she would not be able to get an outpatient MRI under sedation, which she needs for her severe claustrophobia, and was subsequently transferred to [facility] ED.    PAST MEDICAL & SURGICAL HISTORY:  Hypothyroidism      Papilledema      History of tonsillectomy        FAMILY HISTORY:  Family history of diabetes mellitus (Father)        SOCIAL HISTORY: SOCIAL HISTORY:     Marital Status: ( x )   (  ) Single  (  )   (  )      Occupation:      Lives: (  ) alone  (  ) with children   (x  ) with spouse  (  ) with parents  (  ) other    MEDICATIONS  Home Medications:  latanoprost 0.005% ophthalmic solution: 1 drop(s) to each affected eye once a day (in the evening) (11 Oct 2022 22:54)  levothyroxine 88 mcg (0.088 mg) oral tablet: 1 tab(s) orally once a day (11 Oct 2022 22:54)      MEDICATIONS  (STANDING):  latanoprost 0.005% Ophthalmic Solution 1 Drop(s) Both EYES at bedtime    MEDICATIONS  (PRN):      ALLERGIES/INTOLERANCES:  Allergies  penicillin (Hives)    Intolerances      OBJECTIVE:  VITALS   Vital Signs Last 24 Hrs  T(C): 36.8 (12 Oct 2022 01:46), Max: 37.1 (11 Oct 2022 20:13)  T(F): 98.3 (12 Oct 2022 01:46), Max: 98.7 (11 Oct 2022 20:13)  HR: 73 (12 Oct 2022 01:46) (73 - 83)  BP: 124/72 (12 Oct 2022 01:46) (117/81 - 139/85)  BP(mean): --  RR: 20 (12 Oct 2022 01:46) (18 - 20)  SpO2: 100% (12 Oct 2022 01:46) (97% - 100%)    Parameters below as of 12 Oct 2022 01:46  Patient On (Oxygen Delivery Method): room air        PHYSICAL EXAM:  Constitutional: Female, appears stated age, in no apparent distress including pain  Head: Normocephalic & Atraumatic.  Extremities: No cyanosis, clubbing, or edema  Skin: no rash, no bruising     Neurological Exam:  MS: Awake, alert, oriented to person, place, month, year, president. Normal affect. Follows all commands.    Language: Speech is clear, fluent with good repetition & comprehension (able to name objects___)    CNs: PERRLA (R = 3mm, L = 3mm). VFF. EOMI no nystagmus, no diplopia. V1-3 intact to LT/pinprick, well developed masseter muscles b/l. No facial asymmetry b/l, Hearing grossly normal (rubbing fingers) b/l. Symmetric palate elevation in midline. Gag reflex deferred. Head turning & shoulder shrug intact b/l. Tongue midline, normal movements, no atrophy.    Fundoscopic: deferred 2/2 covid 19 pandemic    Motor: Normal muscle bulk & tone. No noticeable tremor or seizure.               Deltoid	Biceps	Triceps 	   R	5	5	5	5	 	  L	5	5	5	5			    	H-Flex	H-Ext	K-Flex	K-Ext	D-Flex	P-Flex  R	5	5	5	5	5	5		   L	5	5	5	5	5	5		     Sensation: Intact to LT/PP in all extremities    Cortical: Extinction on DSS (neglect): none    Reflexes:              Biceps(C5)       BR(C6)     Triceps(C7)               Patellar(L4)    Achilles(S1)    Plantar Resp  R	2	          2	             2		        2		    2		Down   L	2	          2	             2		        2		    2		Down     Coordination:  No dysmetria to FTN/HTS    Gait:  Deferred 2/2 fall risk    LABORATORY:  CBC                       13.4   6.76  )-----------( 317      ( 11 Oct 2022 09:55 )             41.8     Chem 10-11    141  |  107  |  13  ----------------------------<  125<H>  3.8   |  24  |  0.91    Ca    9.6      11 Oct 2022 09:55    TPro  7.4  /  Alb  4.0  /  TBili  0.6  /  DBili  x   /  AST  13<L>  /  ALT  18  /  AlkPhos  62  10-11    LFTs LIVER FUNCTIONS - ( 11 Oct 2022 09:55 )  Alb: 4.0 g/dL / Pro: 7.4 g/dL / ALK PHOS: 62 U/L / ALT: 18 U/L / AST: 13 U/L / GGT: x           Coagulopathy   Lipid Panel   A1c   Cardiac enzymes     U/A   CSF  Immunological  Other    STUDIES & IMAGING:  Studies (EKG, EEG, EMG, etc):     Radiology (XR, CT, MR, U/S, TTE/RAYMON):

## 2022-10-12 NOTE — PROGRESS NOTE ADULT - SUBJECTIVE AND OBJECTIVE BOX
[facility] Division of Hospital Medicine  Adrian Jacobs) MD Jc  Pager [phone]    SUBJECTIVE:  Chief complaint: Papilledema.    Pt seen and evaluated at bedside this AM. No acute vision changes since coming in. Has mild tension like HA mostly on the right side. Reports some hearing sensitivity as well.       ROS: All systems negative except as noted.      Vital Signs Last 24 Hrs  T(C): 36.7 (12 Oct 2022 13:45), Max: 37.1 (11 Oct 2022 20:13)  T(F): 98.1 (12 Oct 2022 13:45), Max: 98.7 (11 Oct 2022 20:13)  HR: 80 (12 Oct 2022 13:45) (69 - 81)  BP: 128/77 (12 Oct 2022 13:45) (122/82 - 145/75)  BP(mean): --  RR: 17 (12 Oct 2022 13:45) (17 - 20)  SpO2: 100% (12 Oct 2022 13:45) (97% - 100%)    Parameters below as of 12 Oct 2022 13:45  Patient On (Oxygen Delivery Method): room air          PHYSICAL EXAM:  Gen- In bed, comfortable, NAD  Eyes- EOMI, PERRLA, nonicteric.  EMNT- Fair dentition. MMM. No tonsilar exudates. No posterior pharynx erythema.  Neck- Supple. No masses. No tracheal deviation.  Resp- CTAB, good effort. No r/r/w. No accessory muscle use.  CVS- RRR, S1S2, no g/r/m. No LE edema.  GI- Soft abd, NT, ND, +BSx4. No HSM.  MSK- No C/C. ROM intact. No crepitus.  Neuro- CN II-XII intact. Speech fluent/face symmetric. Sensation intact.  Skin- No rashes/ulcers. Warm/moist.  Psych- AAOx3. Appropriate mood/affect.      MEDICATION:  MEDICATIONS  (STANDING):  acetaZOLAMIDE    Tablet 250 milliGRAM(s) Oral daily  latanoprost 0.005% Ophthalmic Solution 1 Drop(s) Both EYES at bedtime  levothyroxine 88 MICROGram(s) Oral daily    MEDICATIONS  (PRN):          LABORATORY:                          13.5   7.14  )-----------( 309      ( 12 Oct 2022 05:10 )             41.9     10-12    137  |  102  |  13  ----------------------------<  101<H>  4.0   |  22  |  0.69    Ca    9.8      12 Oct 2022 05:10  Phos  3.4     10-12  Mg     2.10     10-12    TPro  7.4  /  Alb  4.0  /  TBili  0.6  /  DBili  x   /  AST  13<L>  /  ALT  18  /  AlkPhos  62  10-11    PT/INR - ( 12 Oct 2022 05:10 )   PT: 11.7 sec;   INR: 1.01 ratio         PTT - ( 12 Oct 2022 05:10 )  PTT:27.6 sec          COVID-19 PCR: NotDetec (11 Oct 2022 10:55)  COVID-19 PCR: NotDetec (08 Oct 2022 13:42)

## 2022-10-12 NOTE — CONSULT NOTE ADULT - ATTENDING COMMENTS
Papilledema in 2020 which resolved with treatment with latanoprost which has now recurred.  Neurology evaluation recommended by ophthalmology team.     Exam:    MS - A&O x 3, fluent, follows requests, good recall for recent and remote events, normal attention.   CN - II - XII intact except Visual acuity : OD 20/70 without corrective lenses; OS 20/20  Right papilledema.  No RAPD.    Motor - no drift, nl ROGELIO, nl power and tone.   Sensation - intact LT  Coord - no dysmetria, no tremor, normal FNF, HKS.  Gait - normal base and stride, able to tandem normally.     A/P  Ms. Hoffman is a 76 yo woman with a h/o papilledema in 2020 which resolved with treatment with latanoprost which has now recurred.  Other than the ophthalmological abnormalities, she has a normal neurological examination.  We will follow the recommendations of the neuro-ophthalmologists with regard to work up and treatment.  MRI Brain and orbits w/wo gado and MRVenogram.   Thank you
I have interviewed and examined the patient and reviewed the residents note including the history, exam, assessment, and plan.  I agree with the residents assessment and plan.  I have made the appropriate changes where necessary.    Pt has no vision changes and exam unchanged today.  B-scan performed today - not strongly suggestive of optic nerve drusen.  Pt refused DFE, so unable to re-evaluate optic nerves.  Will try again tomorrow.  Continue with w/u for bilateral disc edema at this time.  Pt advised to let team know if she has any new or worsening eye complaints.    Najma Baugh MD

## 2022-10-12 NOTE — PROGRESS NOTE ADULT - PROBLEM SELECTOR PLAN 1
Pt with 1 month of colored floaters, head pressure, and off-balance sensation, now with worsening symptoms over the past 1 week after discontinuing acetazolamide. Pt found to have recurrence of papilledema, unclear etiology.  -Appreciate neuro and ophtho recs.  -MRI brain, MR orbits, MRV.  -Closely monitor vision - recall ophtho if any acute changes.   -Cont diamox for now. Pt with 1 month of colored floaters, head pressure, and off-balance sensation, now with worsening symptoms over the past 1 week after discontinuing acetazolamide. Pt found to have recurrence of papilledema, unclear etiology.  -Appreciate neuro and ophtho recs.  -MRI brain, MR orbits, MRV - severe claustrophobia - will c/s anesthesia for MR w/ sedation.  -Closely monitor vision - recall ophtho if any acute changes.   -Cont diamox for now.

## 2022-10-12 NOTE — ED ADULT NURSE REASSESSMENT NOTE - NS ED NURSE REASSESS COMMENT FT1
Report given to [facility] RN. Pt awake and alert, A&OX4. Pt Denies CP, SOB, HA, N/V, palpitations, fatigue, dizziness, blurry vision. Resp even and unlabored. NAD. Transferred at this time.

## 2022-10-12 NOTE — CONSULT NOTE ADULT - SUBJECTIVE AND OBJECTIVE BOX
[facility] DEPARTMENT OF OPHTHALMOLOGY - INITIAL ADULT CONSULT  -----------------------------------------------------------------------------------------------------------------  Lon Mckeon MD PGY 3  [phone]  -----------------------------------------------------------------------------------------------------------------    HPI: Obtained from ophtho consult note 10/8/22:    Patient is a 77 year old female hx hypothyroid, papilledema dx in 2020 with neg  MRI placed on latanoprost gtt, now with recurrence of papilledema. saw floaters  so went to neuro-ophthalmologist. started on acetazolamide 250mg 2w ago stopped  on 10/5. now c/o parietal HA bilaterally with trouble walking secondary to  sensation of disequilibrium for the past 3w now worsening over the last week.  states that the headache is worse at night but improved when she lays flat. had  an episode of diplopia that lasted 5 minutes last week. describes another  episode of confusion last week for a few minutes while on acetazolamide. takes  dramamine q4h which helps. HA improves with tylenol. no nausea vomiting. no  falls. tingling B hands intermittently. no speech changes. no neck pain back  pain no fever chills. no changes to headache with valsalva. no hx CAD/strokes.  no hx CA. no night sweats or weight loss.    Patient went to [facility] for ophthalmic evaluatoin on 9/24, note states having fan  ocular migraines with zig zagging lines and few flashes of light. Patient note  states pseudopapilledema. No changes in vision. VA 20/25 OU at that time. Was  recommended to continue her latanoprost and acetazolamide.  spoke with  Dr. Cunningham, her neuroophthalmologist this morning, who recommended to go to ER for  MRI. Patient also advised to stop acetazolamide. Patient states that her  flashes of light started about 3 weeks ago and only happen intermittently.  Endorses similar headache, but no vision changes. Denies any eye pain, no TVL,  no sudden black curtain, no floaters, no new flashes, no double vision.    Has needed sedation for MRI in the past    Interval history: Patient went to [facility] yesterday for MRI and gait instability. Transferred back to [facility] for MRI under sedation. No changes in vision.     PAST MEDICAL & SURGICAL HISTORY:  Hypothyroidism      Papilledema      History of tonsillectomy        Past Ocular History: ***  Ophthalmic Medications: ***  FAMILY HISTORY:  Family history of diabetes mellitus (Father)      Social History: ***    MEDICATIONS  (STANDING):  latanoprost 0.005% Ophthalmic Solution 1 Drop(s) Both EYES at bedtime  levothyroxine 88 MICROGram(s) Oral daily    MEDICATIONS  (PRN):    Allergies & Intolerances:   penicillin (Hives)    Review of Systems:  Constitutional: No fever, chills  Eyes: No blurry vision, flashes, floaters, FBS, erythema, discharge, double vision, OU  Neuro: No tremors  Cardiovascular: No chest pain, palpitations  Respiratory: No SOB, no cough  GI: No nausea, vomiting, abdominal pain  : No dysuria  Skin: no rash  Psych: no depression  Endocrine: no polyuria, polydipsia  Heme/lymph: no swelling    VITALS: T(C): 36.9 (10-12-22 @ 06:50)  T(F): 98.4 (10-12-22 @ 06:50), Max: 98.7 (10-11-22 @ 20:13)  HR: 69 (10-12-22 @ 06:50) (69 - 81)  BP: 129/69 (10-12-22 @ 06:50) (117/81 - 137/81)  RR:  (18 - 20)  SpO2:  (97% - 100%)  Wt(kg): --  General: AAO x 3, appropriate mood and affect    Ophthalmology Exam:  Visual acuity (sc): 20/25 OD, 20/20 OS  Pupils: PERRL OU, no APD  Ttono: 16 OD, 16 OS  Extraocular movements (EOMs): Full OU, no pain, no diplopia  Confrontational Visual Field (CVF): Full OD, full OS  Color Plates: 12/12 OD, 12/12 OS    Pen Light Exam (PLE)  External: Flat OU  Lids/Lashes/Lacrimal Ducts: Flat OU    Sclera/Conjunctiva: W+Q OU  Cornea: Cl OU  Anterior Chamber: D+F OU    Iris: Flat OU  Lens: NS OU    Fundus Exam: performed on 10/8 see exam below:    Vitreous: floater OU  Disc, cup/disc: elevated optic nerve with blurred margins, raised vessels OD >  OS, choroidal nevus about 1 disc diameter from disc OS  Macula: wnl OU  Vessels: wnl OU  Periphery: wnl OU    Bscan 10/12/22 with possibly hyperechoic disc in the right eye          [facility] DEPARTMENT OF OPHTHALMOLOGY - INITIAL ADULT CONSULT  -----------------------------------------------------------------------------------------------------------------  Lon Mckeon MD PGY 3  [phone]  -----------------------------------------------------------------------------------------------------------------    HPI: Obtained from ophtho consult note 10/8/22:    Patient is a 77 year old female hx hypothyroid, papilledema dx in 2020 with neg  MRI placed on latanoprost gtt, now with recurrence of papilledema. saw floaters  so went to neuro-ophthalmologist. started on acetazolamide 250mg 2w ago stopped  on 10/5. now c/o parietal HA bilaterally with trouble walking secondary to  sensation of disequilibrium for the past 3w now worsening over the last week.  states that the headache is worse at night but improved when she lays flat. had  an episode of diplopia that lasted 5 minutes last week. describes another  episode of confusion last week for a few minutes while on acetazolamide. takes  dramamine q4h which helps. HA improves with tylenol. no nausea vomiting. no  falls. tingling B hands intermittently. no speech changes. no neck pain back  pain no fever chills. no changes to headache with valsalva. no hx CAD/strokes.  no hx CA. no night sweats or weight loss.    Patient went to [facility] for ophthalmic evaluatoin on 9/24, note states having fan  ocular migraines with zig zagging lines and few flashes of light. Patient note  states pseudopapilledema. No changes in vision. VA 20/25 OU at that time. Was  recommended to continue her latanoprost and acetazolamide.  spoke with  Dr. Cunningham, her neuroophthalmologist this morning, who recommended to go to ER for  MRI. Patient also advised to stop acetazolamide. Patient states that her  flashes of light started about 3 weeks ago and only happen intermittently.  Endorses similar headache, but no vision changes. Denies any eye pain, no TVL,  no sudden black curtain, no floaters, no new flashes, no double vision.    Has needed sedation for MRI in the past    Interval history: Patient went to [facility] yesterday for MRI and gait instability. Transferred back to [facility] for MRI under sedation. No changes in vision.     PAST MEDICAL & SURGICAL HISTORY:  Hypothyroidism      Papilledema      History of tonsillectomy        Past Ocular History: papilledema since 2020  Ophthalmic Medications: ?latanoprost for papilledema  FAMILY HISTORY:  Family history of diabetes mellitus (Father)      Social History: denies etoh/tobacco    MEDICATIONS  (STANDING):  latanoprost 0.005% Ophthalmic Solution 1 Drop(s) Both EYES at bedtime  levothyroxine 88 MICROGram(s) Oral daily    MEDICATIONS  (PRN):    Allergies & Intolerances:   penicillin (Hives)    Review of Systems:  Constitutional: No fever, chills  Eyes: No blurry vision, flashes, floaters, FBS, erythema, discharge, double vision, OU  Neuro: No tremors  Cardiovascular: No chest pain, palpitations  Respiratory: No SOB, no cough  GI: No nausea, vomiting, abdominal pain  : No dysuria  Skin: no rash  Psych: no depression  Endocrine: no polyuria, polydipsia  Heme/lymph: no swelling    VITALS: T(C): 36.9 (10-12-22 @ 06:50)  T(F): 98.4 (10-12-22 @ 06:50), Max: 98.7 (10-11-22 @ 20:13)  HR: 69 (10-12-22 @ 06:50) (69 - 81)  BP: 129/69 (10-12-22 @ 06:50) (117/81 - 137/81)  RR:  (18 - 20)  SpO2:  (97% - 100%)  Wt(kg): --  General: AAO x 3, appropriate mood and affect    Ophthalmology Exam:  Visual acuity (sc): 20/25 OD, 20/20 OS  Pupils: PERRL OU, no APD  Ttono: 16 OD, 16 OS  Extraocular movements (EOMs): Full OU, no pain, no diplopia  Confrontational Visual Field (CVF): Full OD, full OS  Color Plates: 12/12 OD, 12/12 OS    Pen Light Exam (PLE)  External: Flat OU  Lids/Lashes/Lacrimal Ducts: Flat OU    Sclera/Conjunctiva: W+Q OU  Cornea: Cl OU  Anterior Chamber: D+F OU    Iris: Flat OU  Lens: NS OU    Fundus Exam: performed on 10/8 see exam below:    Vitreous: floater OU  Disc, cup/disc: elevated optic nerve with blurred margins, raised vessels OD >  OS, choroidal nevus about 1 disc diameter from disc OS  Macula: wnl OU  Vessels: wnl OU  Periphery: wnl OU    Bscan 10/12/22 with possibly hyperechoic disc in the right eye          [facility] DEPARTMENT OF OPHTHALMOLOGY - INITIAL ADULT CONSULT  -----------------------------------------------------------------------------------------------------------------  Lon Mckeon MD PGY 3  [phone]  -----------------------------------------------------------------------------------------------------------------    HPI: Obtained from ophtho consult note 10/8/22:    Patient is a 77 year old female hx hypothyroid, papilledema dx in 2020 with neg  MRI placed on latanoprost gtt, now with recurrence of papilledema. saw floaters  so went to neuro-ophthalmologist. started on acetazolamide 250mg 2w ago stopped  on 10/5. now c/o parietal HA bilaterally with trouble walking secondary to  sensation of disequilibrium for the past 3w now worsening over the last week.  states that the headache is worse at night but improved when she lays flat. had  an episode of diplopia that lasted 5 minutes last week. describes another  episode of confusion last week for a few minutes while on acetazolamide. takes  dramamine q4h which helps. HA improves with tylenol. no nausea vomiting. no  falls. tingling B hands intermittently. no speech changes. no neck pain back  pain no fever chills. no changes to headache with valsalva. no hx CAD/strokes.  no hx CA. no night sweats or weight loss.    Patient went to [facility] for ophthalmic evaluatoin on 9/24, note states having fan  ocular migraines with zig zagging lines and few flashes of light. Patient note  states pseudopapilledema. No changes in vision. VA 20/25 OU at that time. Was  recommended to continue her latanoprost and acetazolamide.  spoke with  Dr. Cunningham, her neuroophthalmologist this morning, who recommended to go to ER for  MRI. Patient also advised to stop acetazolamide. Patient states that her  flashes of light started about 3 weeks ago and only happen intermittently.  Endorses similar headache, but no vision changes. Denies any eye pain, no TVL,  no sudden black curtain, no floaters, no new flashes, no double vision.    Has needed sedation for MRI in the past    Interval history: Patient went to [facility] yesterday for MRI and gait instability. Transferred back to [facility] for MRI under sedation. No changes in vision, intermittent headache, no pulsatile tinnitus, no changes in vision with head position, no visual complaints.    PAST MEDICAL & SURGICAL HISTORY:  Hypothyroidism      Papilledema      History of tonsillectomy        Past Ocular History: papilledema since 2020  Ophthalmic Medications: ?latanoprost for papilledema  FAMILY HISTORY:  Family history of diabetes mellitus (Father)  No glaucoma, no ARMD      Social History: denies etoh/tobacco    MEDICATIONS  (STANDING):  latanoprost 0.005% Ophthalmic Solution 1 Drop(s) Both EYES at bedtime  levothyroxine 88 MICROGram(s) Oral daily    MEDICATIONS  (PRN):    Allergies & Intolerances:   penicillin (Hives)    Review of Systems:  Constitutional: No fever, chills  Eyes: No blurry vision, flashes, floaters, FBS, erythema, discharge, double vision, OU  Neuro: No tremors  Cardiovascular: No chest pain, palpitations  Respiratory: No SOB, no cough  GI: No nausea, vomiting, abdominal pain  : No dysuria  Skin: no rash  Psych: no depression  Endocrine: no polyuria, polydipsia  Heme/lymph: no swelling    VITALS: T(C): 36.9 (10-12-22 @ 06:50)  T(F): 98.4 (10-12-22 @ 06:50), Max: 98.7 (10-11-22 @ 20:13)  HR: 69 (10-12-22 @ 06:50) (69 - 81)  BP: 129/69 (10-12-22 @ 06:50) (117/81 - 137/81)  RR:  (18 - 20)  SpO2:  (97% - 100%)  Wt(kg): --  General: AAO x 3, appropriate mood and affect    Ophthalmology Exam:  Visual acuity (sc): 20/25 OD, 20/20 OS  Pupils: PERRL OU, no APD  Ttono: 16 OD, 16 OS  Extraocular movements (EOMs): Full OU, no pain, no diplopia  Confrontational Visual Field (CVF): Full OD, full OS  Color Plates: 12/12 OD, 12/12 OS    Pen Light Exam (PLE)  External: Flat OU  Lids/Lashes/Lacrimal Ducts: Flat OU    Sclera/Conjunctiva: W+Q OU  Cornea: Cl OU  Anterior Chamber: D+F OU    Iris: Flat OU  Lens: NS OU    Fundus Exam: performed on 10/8 see exam below:    Vitreous: floater OU  Disc, cup/disc: elevated optic nerve with blurred margins, raised vessels OD >  OS, choroidal nevus about 1 disc diameter from disc OS  Macula: wnl OU  Vessels: wnl OU  Periphery: wnl OU    Bscan 10/12/22 with possibly hyperechoic disc in the right eye

## 2022-10-12 NOTE — PROGRESS NOTE ADULT - PROBLEM SELECTOR PLAN 4
- C/w levothyroxine 88 mcg daily  - TSH mildly elevated 5. F/u FT4/TT3. - C/w levothyroxine 88 mcg daily  - TSH mildly elevated 5. No recent dose cahnges. F/u FT4/TT3.

## 2022-10-12 NOTE — CONSULT NOTE ADULT - ASSESSMENT
78 yo woman with history of hypothyroidism and papilledema (initially dx in 2020) presents with 1 month of colored floaters, head pressure (worse when standing, relieved by laying down), and off-balance sensation, worse over the past 1 week. Pt states that she was initially diagnosed with papilledema back in 2020, with R eye worse than L eye, and was started on latanoprost eye drops. Pt notes that at that time, her only symptom was intermittent colored floaters, which eventually went away. A month ago, however, pt started having colored floaters again, mainly on the R side of her R eye, prompting her to go see her neuro-ophthalmologist, Dr. Peg Cunningham, who diagnosed pt with recurrence of papilledema and started her on acetazolamide 250 mg once daily, which she discontinued last week due to side effects ( tingling in her hands, a metallic taste in her mouth, and muscle cramps, now resolved since discontinuation) . No associated vision loss, tinnitus, 5-minute episode of double vision accompanied by nausea last week.  Pt reports trouble walking in the past month due to off balance sensation for which she started taking Dramamine, getting some relief.    Of note, pt came to [facility] ED 3 days ago (10/8/22) after being referred by her neuro-ophthalmologist for further workup of her current symptoms and was evaluated by ophthalmology, who recommended an MRI head and orbits w/wo contrast and MRV head w/wo contrast. Pt, however, left AMA from the ED due to anxiety preventing her from being able to handle a multiple-day hospital admission. Pt, though, went to [place] ED today after learning that she would not be able to get an outpatient MRI under sedation, which she needs for her severe claustrophobia, and was subsequently transferred to [facility] ED.    Impression: papilledema with head pressure, visual obscurations diplopia possibly 2/2 IIH vs. pseudopapilledema vs. other etiology    Recommendations:  -Optho consult for follow up-appreciate their recent recommendations- bilateral optic nerve head swelling  -MRI brain and orbits w and w/o contrast/MRV brain w/ contrast   -May continue diamox 250 mg once daily for now  -May consider LP  to confirm IIH (if my attending agrees)    To be discussed with neurology attending and will be formally staffed by attending during morning rounds. Recommendations will be finalized once signed by attending.    78 yo woman with history of hypothyroidism and papilledema (initially dx in 2020) presents with 1 month of colored floaters, head pressure (worse when standing, relieved by laying down), and off-balance sensation, worse over the past 1 week. Pt states that she was initially diagnosed with papilledema back in 2020, with R eye worse than L eye, and was started on latanoprost eye drops. Pt notes that at that time, her only symptom was intermittent colored floaters, which eventually went away. A month ago, however, pt started having colored floaters again, mainly on the R side of her R eye, prompting her to go see her neuro-ophthalmologist, Dr. Peg Cunningham, who diagnosed pt with recurrence of papilledema and started her on acetazolamide 250 mg once daily, which she discontinued last week due to side effects ( tingling in her hands, a metallic taste in her mouth, and muscle cramps, now resolved since discontinuation) . No associated vision loss, tinnitus, 5-minute episode of double vision accompanied by nausea last week.  Pt reports trouble walking in the past month due to off balance sensation for which she started taking Dramamine, getting some relief.    Of note, pt came to [facility] ED 3 days ago (10/8/22) after being referred by her neuro-ophthalmologist for further workup of her current symptoms and was evaluated by ophthalmology, who recommended an MRI head and orbits w/wo contrast and MRV head w/wo contrast. Pt, however, left AMA from the ED due to anxiety preventing her from being able to handle a multiple-day hospital admission. Pt, though, went to [place] ED today after learning that she would not be able to get an outpatient MRI under sedation, which she needs for her severe claustrophobia, and was subsequently transferred to [facility] ED.    Impression: papilledema with head pressure, visual obscurations, diplopia possibly 2/2 IIH vs. pseudopapilledema vs. other etiology    Recommendations:  -Optho consult for follow up-appreciate their recent recommendations- bilateral optic nerve head swelling  -MRI brain and orbits w and w/o contrast/MRV brain w/ contrast   -May continue diamox 250 mg once daily for now  -May consider LP  to confirm IIH (if my attending agrees)    To be discussed with neurology attending and will be formally staffed by attending during morning rounds. Recommendations will be finalized once signed by attending.    76 yo woman with history of hypothyroidism and papilledema (initially dx in 2020) presents with 1 month of colored floaters, head pressure (worse when standing, relieved by laying down), and off-balance sensation, worse over the past 1 week. Pt states that she was initially diagnosed with papilledema back in 2020, with R eye worse than L eye, and was started on latanoprost eye drops. Pt notes that at that time, her only symptom was intermittent colored floaters, which eventually went away. A month ago, however, pt started having colored floaters again, mainly on the R side of her R eye, prompting her to go see her neuro-ophthalmologist, Dr. Peg Cunningham, who diagnosed pt with recurrence of papilledema and started her on acetazolamide 250 mg once daily, which she discontinued last week due to side effects ( tingling in her hands, a metallic taste in her mouth, and muscle cramps, now resolved since discontinuation) . No associated vision loss, tinnitus, 5-minute episode of double vision accompanied by nausea last week.  Pt reports trouble walking in the past month due to off balance sensation for which she started taking Dramamine, getting some relief.    Of note, pt came to [facility] ED 3 days ago (10/8/22) after being referred by her neuro-ophthalmologist for further workup of her current symptoms and was evaluated by ophthalmology, who recommended an MRI head and orbits w/wo contrast and MRV head w/wo contrast. Pt, however, left AMA from the ED due to anxiety preventing her from being able to handle a multiple-day hospital admission. Pt, though, went to [place] ED today after learning that she would not be able to get an outpatient MRI under sedation, which she needs for her severe claustrophobia, and was subsequently transferred to [facility] ED.    Impression: papilledema with head pressure, visual obscurations, diplopia possibly 2/2 IIH vs. pseudopapilledema vs. other etiology    Recommendations:  -MRI brain and orbits w and w/o contrast/MRV brain w/ contrast   -Optho consult for follow up-appreciate their recent recommendations- bilateral optic nerve head swelling  -May continue diamox 250 mg once daily for now  -Further recommendations pending imaging    To be discussed with neurology attending and will be formally staffed by attending during morning rounds. Recommendations will be finalized once signed by attending.    78 yo woman with history of hypothyroidism and papilledema (initially dx in 2020) presents with 1 month of colored floaters, head pressure (worse when standing, relieved by laying down), and off-balance sensation, worse over the past 1 week. Pt states that she was initially diagnosed with papilledema back in 2020, with R eye worse than L eye, and was started on latanoprost eye drops. Pt notes that at that time, her only symptom was intermittent colored floaters, which eventually went away. A month ago, however, pt started having colored floaters again, mainly on the R side of her R eye, prompting her to go see her neuro-ophthalmologist, Dr. Peg Cunningham, who diagnosed pt with recurrence of papilledema and started her on acetazolamide 250 mg once daily, which she discontinued last week due to side effects ( tingling in her hands, a metallic taste in her mouth, and muscle cramps, now resolved since discontinuation) . No associated vision loss, tinnitus, 5-minute episode of double vision accompanied by nausea last week.  Pt reports trouble walking in the past month due to off balance sensation 2/2 head pressure for which she started taking Dramamine, getting some relief.    Of note, pt came to [facility] ED 3 days ago (10/8/22) after being referred by her neuro-ophthalmologist for further workup of her current symptoms and was evaluated by ophthalmology, who recommended an MRI head and orbits w/wo contrast and MRV head w/wo contrast. Pt, however, left AMA from the ED due to anxiety preventing her from being able to handle a multiple-day hospital admission. Pt, though, went to [place] ED today after learning that she would not be able to get an outpatient MRI under sedation, which she needs for her severe claustrophobia, and was subsequently transferred to [facility] ED.    Neuro exam nonfocal    Impression: papilledema with head pressure, visual obscurations, diplopia possibly 2/2 IIH vs. pseudopapilledema vs. other etiology    Recommendations:  -MRI brain and orbits w and w/o contrast/MRV brain w/ contrast   -Optho consult for follow up-appreciate their recent recommendations- bilateral optic nerve head swelling  -May continue diamox 250 mg once daily for now  -Further recommendations pending imaging    To be discussed with neurology attending and will be formally staffed by attending during morning rounds. Recommendations will be finalized once signed by attending.

## 2022-10-12 NOTE — CONSULT NOTE ADULT - ASSESSMENT
Assessment and Recommendations:  77y female with a past medical history/ocular history of hypothyroidism, papilledema consulted for ?APD and papilledema after being sent in by her neuroophthalmologist, found to have swollen optic discs bilaterally consistent with papilledema, with no vision changes. VA 20/25 OD 20/20 OS, IOP within normal limits, PERRLA with no APD appreciated on exam, EOMI, CVF full, color plates full, anterior exam with mild cataracts. Dilated exam with optic nerve head swelling with blurred margins, slightly crowded disc OD, edema more significant OD > OS. Discussed with Dr. Cunningham as patient was previously worked up for papilledema in 2020, and appears to have multiple episodes in the past. Stated that she should have MRI for workup. Additionally, OD has always been nerve that has more swelling, no APD was appreciated on prior exams either.    # Papilledema  - patient with bilateral optic nerve head swelling, previously documented in  outpatient records OD > OS  - Vision intact, no sign of optic nerve dysfunction  - no trauma, no falls  - Bscan 10/12/22 with possibly hyperechoic disc in the right eye   - differential includes ICP, pseudopapilledema, IIH (though patient older  population and not obese), AVM  - recommend MRI brain and orbits w and w/o contrast  - recommend MRV as well to rule out any thrombosis  - pending MRI results can consider LP  - recommend neurology consult    # Choroidal nevus, OS  - flat in appearance  - no orange pigment  - close to disc margin  - no drusen, no halo    Previously discussed with Dr. Ruano, neuroophthalmologist.    Outpatient Follow-up: Patient should follow-up with his/her ophthalmologist or with [facility] Department of Ophthalmology within 1 week of after discharge at:    [address]  [place] [address]  [phone]   Assessment and Recommendations:  77y female with a past medical history/ocular history of hypothyroidism, papilledema consulted for ?APD and papilledema after being sent in by her neuroophthalmologist, found to have swollen optic discs bilaterally consistent with papilledema, with no vision changes. VA 20/25 OD 20/20 OS, IOP within normal limits, PERRLA with no APD appreciated on exam, EOMI, CVF full, color plates full, anterior exam with mild cataracts. Dilated exam with optic nerve head swelling with blurred margins, slightly crowded disc OD, edema more significant OD > OS. Discussed with Dr. Cunningham as patient was previously worked up for papilledema in 2020, and appears to have multiple episodes in the past. Stated that she should have MRI for workup. Additionally, OD has always been nerve that has more swelling, no APD was appreciated on prior exams either.    # Papilledema  - patient with bilateral optic nerve head swelling, previously documented in  outpatient records OD > OS  - Vision intact, no sign of optic nerve dysfunction  - no trauma, no falls  - Bscan 10/12/22 with possibly hyperechoic disc in the right eye   - Patient refuse repeat dilated exam today, will try tomorrow  - differential includes ICP, pseudopapilledema, IIH (though patient older  population and not obese), AVM  - recommend MRI brain and orbits w and w/o contrast  - recommend MRV as well to rule out any thrombosis  - pending MRI results can consider LP  - recommend neurology consult    # Choroidal nevus, OS  - flat in appearance  - no orange pigment  - close to disc margin  - no drusen, no halo    Previously discussed with Dr. Ruano, neuroophthalmologist.    Outpatient Follow-up: Patient should follow-up with his/her ophthalmologist or with [facility] Department of Ophthalmology within 1 week of after discharge at:    [address]  [place] [address]  [phone]   Assessment and Recommendations:  77y female with a past medical history/ocular history of hypothyroidism, papilledema consulted for ?APD and papilledema after being sent in by her neuroophthalmologist, found to have swollen optic discs bilaterally consistent with papilledema, with no vision changes. VA 20/25 OD 20/20 OS, IOP within normal limits, PERRLA with no APD appreciated on exam, EOMI, CVF full, color plates full, anterior exam with mild cataracts. Dilated exam with optic nerve head swelling with blurred margins, slightly crowded disc OD, edema more significant OD > OS. Discussed with Dr. Cunningham as patient was previously worked up for papilledema in 2020, and appears to have multiple episodes in the past. Stated that she should have MRI for workup. Additionally, OD has always been nerve that has more swelling, no APD was appreciated on prior exams either.    # Papilledema  - patient with bilateral optic nerve head swelling, previously documented in  outpatient records OD > OS  - Vision intact, no sign of optic nerve dysfunction  - no trauma, no falls  - Bscan 10/12/22 with possibly hyperechoic disc in the right eye   - Patient refuse repeat dilated exam today, will try tomorrow  - differential includes elevated ICP, pseudopapilledema, IIH (though patient older population and not obese), AVM  - recommend MRI brain and orbits w and w/o contrast  - recommend MRV as well to rule out any thrombosis  - pending MRI results can consider LP per neurology  - recommend neurology consult    # Choroidal nevus, OS  - flat in appearance  - no orange pigment  - close to disc margin  - no drusen, no halo, will monitor  - findings and plan discussed with patient and primary team    Previously discussed with Dr. Ruano, neuroophthalmologist.    Outpatient Follow-up: Patient should follow-up with his/her ophthalmologist or with [facility] Department of Ophthalmology within 1 week of after discharge, sooner if symptoms worsen or change at:    [address]  [place] [address]  [phone]

## 2022-10-13 LAB
ANION GAP SERPL CALC-SCNC: 13 MMOL/L — SIGNIFICANT CHANGE UP (ref 7–14)
BUN SERPL-MCNC: 15 MG/DL — SIGNIFICANT CHANGE UP (ref 7–23)
CALCIUM SERPL-MCNC: 9.6 MG/DL — SIGNIFICANT CHANGE UP (ref 8.4–10.5)
CHLORIDE SERPL-SCNC: 101 MMOL/L — SIGNIFICANT CHANGE UP (ref 98–107)
CO2 SERPL-SCNC: 22 MMOL/L — SIGNIFICANT CHANGE UP (ref 22–31)
CREAT SERPL-MCNC: 0.77 MG/DL — SIGNIFICANT CHANGE UP (ref 0.5–1.3)
EGFR: 78 ML/MIN/1.73M2 — SIGNIFICANT CHANGE UP
GLUCOSE SERPL-MCNC: 101 MG/DL — HIGH (ref 70–99)
HCT VFR BLD CALC: 43.4 % — SIGNIFICANT CHANGE UP (ref 34.5–45)
HGB BLD-MCNC: 14.4 G/DL — SIGNIFICANT CHANGE UP (ref 11.5–15.5)
MAGNESIUM SERPL-MCNC: 2.1 MG/DL — SIGNIFICANT CHANGE UP (ref 1.6–2.6)
MCHC RBC-ENTMCNC: 27.5 PG — SIGNIFICANT CHANGE UP (ref 27–34)
MCHC RBC-ENTMCNC: 33.2 GM/DL — SIGNIFICANT CHANGE UP (ref 32–36)
MCV RBC AUTO: 83 FL — SIGNIFICANT CHANGE UP (ref 80–100)
NRBC # BLD: 0 /100 WBCS — SIGNIFICANT CHANGE UP (ref 0–0)
NRBC # FLD: 0 K/UL — SIGNIFICANT CHANGE UP (ref 0–0)
PHOSPHATE SERPL-MCNC: 3.3 MG/DL — SIGNIFICANT CHANGE UP (ref 2.5–4.5)
PLATELET # BLD AUTO: 323 K/UL — SIGNIFICANT CHANGE UP (ref 150–400)
POTASSIUM SERPL-MCNC: 4 MMOL/L — SIGNIFICANT CHANGE UP (ref 3.5–5.3)
POTASSIUM SERPL-SCNC: 4 MMOL/L — SIGNIFICANT CHANGE UP (ref 3.5–5.3)
RBC # BLD: 5.23 M/UL — HIGH (ref 3.8–5.2)
RBC # FLD: 14.3 % — SIGNIFICANT CHANGE UP (ref 10.3–14.5)
SODIUM SERPL-SCNC: 136 MMOL/L — SIGNIFICANT CHANGE UP (ref 135–145)
T4 FREE SERPL-MCNC: 2 NG/DL — HIGH (ref 0.9–1.8)
TSH SERPL-MCNC: 7.02 UIU/ML — HIGH (ref 0.27–4.2)
WBC # BLD: 6.46 K/UL — SIGNIFICANT CHANGE UP (ref 3.8–10.5)
WBC # FLD AUTO: 6.46 K/UL — SIGNIFICANT CHANGE UP (ref 3.8–10.5)

## 2022-10-13 PROCEDURE — 99233 SBSQ HOSP IP/OBS HIGH 50: CPT

## 2022-10-13 PROCEDURE — 70546 MR ANGIOGRAPH HEAD W/O&W/DYE: CPT | Mod: 26,59

## 2022-10-13 PROCEDURE — 70553 MRI BRAIN STEM W/O & W/DYE: CPT | Mod: 26

## 2022-10-13 PROCEDURE — 93306 TTE W/DOPPLER COMPLETE: CPT | Mod: 26

## 2022-10-13 PROCEDURE — 70543 MRI ORBT/FAC/NCK W/O &W/DYE: CPT | Mod: 26

## 2022-10-13 RX ORDER — DIPHENHYDRAMINE HCL 50 MG
25 CAPSULE ORAL ONCE
Refills: 0 | Status: COMPLETED | OUTPATIENT
Start: 2022-10-13 | End: 2022-10-13

## 2022-10-13 RX ORDER — ACETAMINOPHEN 500 MG
250 TABLET ORAL ONCE
Refills: 0 | Status: DISCONTINUED | OUTPATIENT
Start: 2022-10-13 | End: 2022-10-13

## 2022-10-13 RX ORDER — ACETAMINOPHEN 500 MG
325 TABLET ORAL ONCE
Refills: 0 | Status: COMPLETED | OUTPATIENT
Start: 2022-10-13 | End: 2022-10-13

## 2022-10-13 RX ADMIN — LATANOPROST 1 DROP(S): 0.05 SOLUTION/ DROPS OPHTHALMIC; TOPICAL at 21:31

## 2022-10-13 RX ADMIN — Medication 3 MILLIGRAM(S): at 21:31

## 2022-10-13 RX ADMIN — ENOXAPARIN SODIUM 40 MILLIGRAM(S): 100 INJECTION SUBCUTANEOUS at 18:43

## 2022-10-13 RX ADMIN — Medication 25 MILLIGRAM(S): at 21:36

## 2022-10-13 RX ADMIN — Medication 88 MICROGRAM(S): at 05:29

## 2022-10-13 NOTE — PROGRESS NOTE ADULT - PROBLEM SELECTOR PLAN 4
- C/w levothyroxine 88 mcg daily  - Clinically euthyroid.  - TSH elevated, as is T4. Await head imaging.  - Hold off changes to dosage at this time.

## 2022-10-13 NOTE — CHART NOTE - NSCHARTNOTEFT_GEN_A_CORE
MRI results reviewed  Recommend LP with opening pressure, NMO, MOG antibodies MRI results reviewed  Recommend LP with opening pressure, NMO, MOG antibodies        MRI ORBITS: There is flattening of the posterior aspect of the sclera.   There is slight protrusion of the right optic papilla. Prominent   bilateral optic nerve sheath fluid is appreciated. There is vertical   tortuosity of the optic nerves. The extraocular muscles and orbital fat   appear within normal limits. The lenses are located.    The optic chiasm appears unremarkable.    The cavernous sinuses enhance symmetrically with contrast.    MRV HEAD: The superior, inferior, straight, confluence of, and transverse   sinuses are patent. There is no evidence of venous sinus thrombosis. The   right transverse sinus is dominant compared to the left. No venous sinus   stenosis is appreciated.    IMPRESSION:    MRI BRAIN AND ORBITS:  1. Imaging findings compatible with raised intracranial pressure.  2. Evidence of bilateral papilledema.  3. No acute intracranial hemorrhage, acute ischemia, or abnormal   intracranial enhancement.  4. Multiple nonspecific abnormal white matter foci of T2/FLAIR   prolongation statistically favoring microvascular disease.    MRV HEAD:  1. No evidence of venous sinus thrombosis or overt imaging findings of   stenosis.    --- End of Report ---            GERMANIA CASTANON MD; Attending Radiologist  This document has been electronically signed. Oct 13 2022  4:18PM

## 2022-10-13 NOTE — PATIENT PROFILE ADULT - FALL HARM RISK - HARM RISK INTERVENTIONS
Assistance with ambulation/Assistance OOB with selected safe patient handling equipment/Communicate Risk of Fall with Harm to all staff/Monitor gait and stability/Reinforce activity limits and safety measures with patient and family/Sit up slowly, dangle for a short time, stand at bedside before walking/Tailored Fall Risk Interventions/Visual Cue: Yellow wristband and red socks/Bed in lowest position, wheels locked, appropriate side rails in place/Call bell, personal items and telephone in reach/Instruct patient to call for assistance before getting out of bed or chair/Non-slip footwear when patient is out of bed/[place] to call system/Physically safe environment - no spills, clutter or unnecessary equipment/Purposeful Proactive Rounding/Room/bathroom lighting operational, light cord in reach

## 2022-10-13 NOTE — CHART NOTE - NSCHARTNOTEFT_GEN_A_CORE
Neurology team reviewed MR imaging.    -10/13 MRI BRAIN AND ORBITS: Imaging findings compatible with raised intracranial pressure. Evidence of bilateral papilledema. No acute intracranial hemorrhage, acute ischemia, or abnormal intracranial enhancement. Multiple nonspecific abnormal white matter foci of T2/FLAIR   prolongation statistically favoring microvascular disease.  -10/13 MRV HEAD: No evidence of venous sinus thrombosis or overt imaging findings of stenosis.    Impression: Visual disturbance. Papilledema with MR findings consistent with increased intracranial pressure.     Recommendations:  [] Neuro checks per routine.  [x] MRI Head/Orbits, w/wo: results as above.  [] MRV Head w/ contrast: results as above.  [] Please reach out to procedure team to schedule bedside LP. Will need to obtain opening pressure, then send CSF glucose, protein, cell count, gram stain/culture, PCR viral panel, cytopathology, flow cytometry, NMO Ab, MOG Ab, MBP, oligoclonal bands.  [] Would send serum NMO, MOG.  [] TSH 7.02 <H>, T4 2.0 <H>. Would send T3.  [] Rest of care per primary team.    Case discussed with neurology attending Dr. Amos. Neurology team reviewed MR imaging.    -10/13 MRI BRAIN AND ORBITS: Imaging findings compatible with raised intracranial pressure. Evidence of bilateral papilledema. No acute intracranial hemorrhage, acute ischemia, or abnormal intracranial enhancement. Multiple nonspecific abnormal white matter foci of T2/FLAIR   prolongation statistically favoring microvascular disease.  -10/13 MRV HEAD: No evidence of venous sinus thrombosis or overt imaging findings of stenosis.    Impression: Visual disturbance. Papilledema with MR findings consistent with increased intracranial pressure.     Recommendations:  [] Neuro checks per routine.  [x] MRI Head/Orbits, w/wo: results as above.  [x] MRV Head w/ contrast: results as above.  [] Please reach out to procedure team to schedule bedside LP. Will need to obtain opening pressure, then send CSF glucose, protein, cell count, gram stain/culture, PCR viral panel, cytopathology, flow cytometry, NMO Ab, MOG Ab, MBP, oligoclonal bands.  [] Would send serum NMO, MOG.  [] TSH 7.02 <H>, T4 2.0 <H>. Would send T3.  [] Rest of care per primary team.    Case discussed with neurology attending Dr. Amos. Neurology team reviewed MR imaging.    -10/13 MRI BRAIN AND ORBITS: Imaging findings compatible with raised intracranial pressure. Evidence of bilateral papilledema. No acute intracranial hemorrhage, acute ischemia, or abnormal intracranial enhancement. Multiple nonspecific abnormal white matter foci of T2/FLAIR   prolongation statistically favoring microvascular disease.  -10/13 MRV HEAD: No evidence of venous sinus thrombosis or overt imaging findings of stenosis.    Impression: Visual disturbance. Papilledema with MR findings consistent with increased intracranial pressure.     Recommendations:  [] Neuro checks per routine.  [x] MRI Head/Orbits, w/wo: results as above.  [x] MRV Head w/ contrast: results as above.  [] Please reach out to procedure team to schedule bedside LP. Will need to obtain opening pressure, then send CSF glucose, protein, cell count, gram stain/culture, PCR viral panel, cytopathology, flow cytometry, NMO Ab, MOG Ab, MBP, oligoclonal bands.  [] Would send serum NMO, MOG.  [] TSH 7.02 <H>, T4 2.0 <H>. Would send T3.  We appreciate guidance for further work up per ophthalmology and neuro-ophthalmologist also.  [] Rest of care per primary team.    Case discussed with neurology attending Dr. Amos. Neurology team reviewed MR imaging.    -10/13 MRI BRAIN AND ORBITS: Imaging findings compatible with raised intracranial pressure. Evidence of bilateral papilledema. No acute intracranial hemorrhage, acute ischemia, or abnormal intracranial enhancement. Multiple nonspecific abnormal white matter foci of T2/FLAIR   prolongation statistically favoring microvascular disease.  -10/13 MRV HEAD: No evidence of venous sinus thrombosis or overt imaging findings of stenosis.    Impression: Visual disturbance. Papilledema with MR findings consistent with increased intracranial pressure.     Recommendations:  [] Neuro checks per routine.  [x] MRI Head/Orbits, w/wo: results as above.  [x] MRV Head w/ contrast: results as above.  [] Please reach out to procedure team to schedule bedside LP. Will need to obtain opening pressure, then send CSF glucose, protein, cell count, gram stain/culture, PCR viral panel, cytopathology, flow cytometry, NMO Ab, MOG Ab, MBP, oligoclonal bands.  [] Would send serum NMO, MOG.  [] TSH 7.02 <H>, T4 2.0 <H>. Would send T3.  [] We appreciate guidance for further work up per ophthalmology and neuro-ophthalmologist also.  [] Rest of care per primary team.    Case discussed with neurology attending Dr. Amos.

## 2022-10-13 NOTE — PROGRESS NOTE ADULT - SUBJECTIVE AND OBJECTIVE BOX
[facility] Division of Hospital Medicine  Adrian Jacobs) MD Jc  Pager [phone]    SUBJECTIVE:  Chief complaint: Papilledema.    Pt seen and evaluated at bedside this AM. No acute vision changes since coming in. reports tension like HA now on the left. Still reports mild auditory sensitivity. family member at bedside.       ROS: All systems negative except as noted.    Vital Signs Last 24 Hrs  T(C): 36.8 (13 Oct 2022 10:30), Max: 36.9 (12 Oct 2022 20:02)  T(F): 98.2 (13 Oct 2022 10:30), Max: 98.4 (12 Oct 2022 20:02)  HR: 78 (13 Oct 2022 10:30) (76 - 89)  BP: 124/76 (13 Oct 2022 10:30) (117/64 - 139/76)  BP(mean): --  RR: 17 (13 Oct 2022 10:30) (17 - 18)  SpO2: 99% (13 Oct 2022 10:30) (98% - 100%)    Parameters below as of 13 Oct 2022 10:30  Patient On (Oxygen Delivery Method): room air      PHYSICAL EXAM:  Gen- In bed, comfortable, NAD  Eyes- EOMI, PERRLA, nonicteric.  EMNT- Fair dentition. MMM. No tonsilar exudates. No posterior pharynx erythema.  Neck- Supple. No masses. No tracheal deviation.  Resp- CTAB, good effort. No r/r/w. No accessory muscle use.  CVS- RRR, S1S2, no g/r/m. No LE edema.  GI- Soft abd, NT, ND, +BSx4. No HSM.  MSK- No C/C. ROM intact. No crepitus.  Neuro- CN II-XII intact. Speech fluent/face symmetric. Sensation intact.  Skin- No rashes/ulcers. Warm/moist.  Psych- AAOx3. Appropriate mood/affect.      MEDICATION:  MEDICATIONS  (STANDING):  acetaminophen     Tablet .. 325 milliGRAM(s) Oral once  acetaZOLAMIDE    Tablet 250 milliGRAM(s) Oral daily  diphenhydrAMINE 25 milliGRAM(s) Oral once  enoxaparin Injectable 40 milliGRAM(s) SubCutaneous every 24 hours  latanoprost 0.005% Ophthalmic Solution 1 Drop(s) Both EYES at bedtime  levothyroxine 88 MICROGram(s) Oral daily    MEDICATIONS  (PRN):  melatonin 3 milliGRAM(s) Oral at bedtime PRN Insomnia        LABORATORY:                          14.4   6.46  )-----------( 323      ( 13 Oct 2022 07:30 )             43.4     10-13    136  |  101  |  15  ----------------------------<  101<H>  4.0   |  22  |  0.77    Ca    9.6      13 Oct 2022 07:30  Phos  3.3     10-13  Mg     2.10     10-13      PT/INR - ( 12 Oct 2022 05:10 )   PT: 11.7 sec;   INR: 1.01 ratio         PTT - ( 12 Oct 2022 05:10 )  PTT:27.6 sec          COVID-19 PCR: NotDetec (11 Oct 2022 10:55)  COVID-19 PCR: Rianatevon (08 Oct 2022 13:42)

## 2022-10-13 NOTE — PROGRESS NOTE ADULT - PROBLEM SELECTOR PLAN 1
Pt with 1 month of colored floaters, head pressure, and off-balance sensation, now with worsening symptoms over the past 1 week after discontinuing acetazolamide. Pt found to have recurrence of papilledema, unclear etiology.  -Appreciate neuro and ophtho recs.  -MRI brain, MR orbits, MRV - severe claustrophobia - study in progress.   -Pt deferred dilated fundoscopic exam till MR is done.  -Cont diamox for now.

## 2022-10-14 PROCEDURE — 88189 FLOWCYTOMETRY/READ 16 & >: CPT

## 2022-10-14 PROCEDURE — 99232 SBSQ HOSP IP/OBS MODERATE 35: CPT

## 2022-10-14 RX ORDER — LIDOCAINE HCL 20 MG/ML
20 VIAL (ML) INJECTION ONCE
Refills: 0 | Status: COMPLETED | OUTPATIENT
Start: 2022-10-14 | End: 2022-10-14

## 2022-10-14 RX ORDER — LIDOCAINE HCL 20 MG/ML
20 VIAL (ML) INJECTION ONCE
Refills: 0 | Status: DISCONTINUED | OUTPATIENT
Start: 2022-10-14 | End: 2022-10-14

## 2022-10-14 RX ADMIN — Medication 20 MILLILITER(S): at 12:05

## 2022-10-14 RX ADMIN — LATANOPROST 1 DROP(S): 0.05 SOLUTION/ DROPS OPHTHALMIC; TOPICAL at 23:28

## 2022-10-14 RX ADMIN — Medication 88 MICROGRAM(S): at 05:39

## 2022-10-14 NOTE — CHART NOTE - NSCHARTNOTEFT_GEN_A_CORE
Discussed case with neuroradiology Dr. Moore and medicine attending Dr. Greene. Per neuroradiology, lumbar puncture will performed on 10/15/2022. Will continue to monitor patient closely.

## 2022-10-14 NOTE — PROGRESS NOTE ADULT - PROBLEM SELECTOR PLAN 1
Pt with 1 month of colored floaters, head pressure, and off-balance sensation, now with worsening symptoms over the past 1 week after discontinuing acetazolamide. Pt found to have recurrence of papilledema, unclear etiology.  -Appreciate neuro and ophtho recs.  -MRI brain, MR orbits, MRV - severe claustrophobia, done under anesthesia. Findings reviewed, concerning for inc ICP. Rest of read as per official report.   -Pt refused diamox.  -LP attempted, unsuccessful - will c/s neurorads for image guided LP. Spoke w/ Dr. Moore re: case. Will f/u.

## 2022-10-14 NOTE — PROGRESS NOTE ADULT - PROBLEM SELECTOR PLAN 5
- DVT ppx: Hold lovenox pending LP.  - Diet: Soft and bite sized    Dispo- Anticipate to home pending w/u. Pending LP.

## 2022-10-14 NOTE — CHART NOTE - NSCHARTNOTEFT_GEN_A_CORE
PRE-INTERVENTIONAL RADIOLOGY PROCEDURE NOTE      Patient Age: 77    Patient Gender: Female    Procedure: Lumbar Puncture    Diagnosis/Indication: Papilledema with increased intracranial pressure    Interventional Radiology Attending Physician: Dr. Moore    Ordering Attending Physician: Dr. Greene    Pertinent Medical History: 76 yo woman with history of hypothyroidism, recurrent papilledema (initially dx in 2020), and osteoporosis presents with 1 month of colored floaters, head pressure, and off-balance sensation, worse over the past 1 week    Pertinent labs:                      14.4   6.46  )-----------( 323      ( 13 Oct 2022 07:30 )             43.4       10-13    136  |  101  |  15  ----------------------------<  101<H>  4.0   |  22  |  0.77    Ca    9.6      13 Oct 2022 07:30  Phos  3.3     10-13  Mg     2.10     10-13      Patient and Family Aware ? Yes    Sincere Rizzo PA-C  [id number] PRE-INTERVENTIONAL RADIOLOGY PROCEDURE NOTE      Patient Age: 77    Patient Gender: Female    Procedure: Lumbar Puncture with opening pressures    Diagnosis/Indication: Papilledema with increased intracranial pressure    Interventional Radiology Attending Physician: Dr. Moore    Ordering Attending Physician: Dr. Greene    Pertinent Medical History: 76 yo woman with history of hypothyroidism, recurrent papilledema (initially dx in 2020), and osteoporosis presents with 1 month of colored floaters, head pressure, and off-balance sensation, worse over the past 1 week    Pertinent labs:                      14.4   6.46  )-----------( 323      ( 13 Oct 2022 07:30 )             43.4       10-13    136  |  101  |  15  ----------------------------<  101<H>  4.0   |  22  |  0.77    Ca    9.6      13 Oct 2022 07:30  Phos  3.3     10-13  Mg     2.10     10-13      Patient and Family Aware ? Yes    Sincere Rizzo PA-C  [id number]

## 2022-10-14 NOTE — PROCEDURE NOTE - ADDITIONAL PROCEDURE DETAILS
Consent was obtained from patient for LP s/p risk/benefit discussion. LP requested in context of papilledemas and elevated ICP w/ normal MRI, sx of eyes flashing and HA.  Patient denies ac use, no NSAIDs/asa/plavix, last lovenox ppx was 10/13.  Patient amenable to procedure.  Sterile technique used.  LP was unsuccessful s/p attempt x 2 due to presumed DJD/osteophytes unable to access space.  Rec Neuro Rads c/s for fluro guided LP.

## 2022-10-14 NOTE — PROGRESS NOTE ADULT - PROBLEM SELECTOR PLAN 4
- C/w levothyroxine 88 mcg daily  - Clinically euthyroid.  - TSH elevated, as is T4. Head imaging showed no obv intracranial lesions.   - Hold off changes to dosage at this time. Can further follow up as OP.

## 2022-10-14 NOTE — PROGRESS NOTE ADULT - SUBJECTIVE AND OBJECTIVE BOX
[facility] Division of Hospital Medicine  Adrian BarrazaTobyhanna) MD Jc  Pager [phone]    SUBJECTIVE:  Chief complaint: Papilledema.    Pt seen and evaluated at bedside this AM. No new vision issues. Continues to report on/off HAs. Tolerating PO. No N/V. No f/c.      ROS: All systems negative except as noted.    Vital Signs Last 24 Hrs  T(C): 36.8 (14 Oct 2022 06:10), Max: 36.9 (13 Oct 2022 23:51)  T(F): 98.3 (14 Oct 2022 06:10), Max: 98.5 (13 Oct 2022 23:51)  HR: 80 (14 Oct 2022 06:10) (75 - 84)  BP: 122/74 (14 Oct 2022 06:10) (122/74 - 146/72)  BP(mean): --  RR: 18 (14 Oct 2022 06:10) (17 - 18)  SpO2: 100% (14 Oct 2022 06:10) (99% - 100%)    Parameters below as of 14 Oct 2022 06:10  Patient On (Oxygen Delivery Method): room air      PHYSICAL EXAM:  Gen- In bed, comfortable, NAD  Eyes- EOMI, PERRLA, nonicteric.  EMNT- Fair dentition. MMM. No tonsilar exudates. No posterior pharynx erythema.  Neck- Supple. No masses. No tracheal deviation.  Resp- CTAB, good effort. No r/r/w. No accessory muscle use.  CVS- RRR, S1S2, no g/r/m. No LE edema.  GI- Soft abd, NT, ND, +BSx4. No HSM.  MSK- No C/C. ROM intact. No crepitus.  Neuro- CN II-XII intact. Speech fluent/face symmetric. Sensation intact.  Skin- No rashes/ulcers. Warm/moist.  Psych- AAOx3. Appropriate mood/affect.      MEDICATION:  MEDICATIONS  (STANDING):  latanoprost 0.005% Ophthalmic Solution 1 Drop(s) Both EYES at bedtime  levothyroxine 88 MICROGram(s) Oral daily    MEDICATIONS  (PRN):  melatonin 3 milliGRAM(s) Oral at bedtime PRN Insomnia      LABORATORY:  No labs today.

## 2022-10-15 LAB
ANION GAP SERPL CALC-SCNC: 12 MMOL/L — SIGNIFICANT CHANGE UP (ref 7–14)
APTT BLD: 26.5 SEC — LOW (ref 27–36.3)
BUN SERPL-MCNC: 13 MG/DL — SIGNIFICANT CHANGE UP (ref 7–23)
CALCIUM SERPL-MCNC: 9.1 MG/DL — SIGNIFICANT CHANGE UP (ref 8.4–10.5)
CHLORIDE SERPL-SCNC: 103 MMOL/L — SIGNIFICANT CHANGE UP (ref 98–107)
CO2 SERPL-SCNC: 22 MMOL/L — SIGNIFICANT CHANGE UP (ref 22–31)
CREAT SERPL-MCNC: 0.71 MG/DL — SIGNIFICANT CHANGE UP (ref 0.5–1.3)
EGFR: 86 ML/MIN/1.73M2 — SIGNIFICANT CHANGE UP
GLUCOSE SERPL-MCNC: 91 MG/DL — SIGNIFICANT CHANGE UP (ref 70–99)
HCT VFR BLD CALC: 39.9 % — SIGNIFICANT CHANGE UP (ref 34.5–45)
HGB BLD-MCNC: 13 G/DL — SIGNIFICANT CHANGE UP (ref 11.5–15.5)
INR BLD: 1.05 RATIO — SIGNIFICANT CHANGE UP (ref 0.88–1.16)
MAGNESIUM SERPL-MCNC: 2 MG/DL — SIGNIFICANT CHANGE UP (ref 1.6–2.6)
MCHC RBC-ENTMCNC: 27.2 PG — SIGNIFICANT CHANGE UP (ref 27–34)
MCHC RBC-ENTMCNC: 32.6 GM/DL — SIGNIFICANT CHANGE UP (ref 32–36)
MCV RBC AUTO: 83.5 FL — SIGNIFICANT CHANGE UP (ref 80–100)
NRBC # BLD: 0 /100 WBCS — SIGNIFICANT CHANGE UP (ref 0–0)
NRBC # FLD: 0 K/UL — SIGNIFICANT CHANGE UP (ref 0–0)
PHOSPHATE SERPL-MCNC: 2.7 MG/DL — SIGNIFICANT CHANGE UP (ref 2.5–4.5)
PLATELET # BLD AUTO: 316 K/UL — SIGNIFICANT CHANGE UP (ref 150–400)
POTASSIUM SERPL-MCNC: 3.8 MMOL/L — SIGNIFICANT CHANGE UP (ref 3.5–5.3)
POTASSIUM SERPL-SCNC: 3.8 MMOL/L — SIGNIFICANT CHANGE UP (ref 3.5–5.3)
PROTHROM AB SERPL-ACNC: 12.2 SEC — SIGNIFICANT CHANGE UP (ref 10.5–13.4)
RBC # BLD: 4.78 M/UL — SIGNIFICANT CHANGE UP (ref 3.8–5.2)
RBC # FLD: 13.8 % — SIGNIFICANT CHANGE UP (ref 10.3–14.5)
SODIUM SERPL-SCNC: 137 MMOL/L — SIGNIFICANT CHANGE UP (ref 135–145)
WBC # BLD: 6.79 K/UL — SIGNIFICANT CHANGE UP (ref 3.8–10.5)
WBC # FLD AUTO: 6.79 K/UL — SIGNIFICANT CHANGE UP (ref 3.8–10.5)

## 2022-10-15 PROCEDURE — 99232 SBSQ HOSP IP/OBS MODERATE 35: CPT

## 2022-10-15 RX ORDER — DIPHENHYDRAMINE HCL 50 MG
25 CAPSULE ORAL ONCE
Refills: 0 | Status: COMPLETED | OUTPATIENT
Start: 2022-10-15 | End: 2022-10-15

## 2022-10-15 RX ADMIN — Medication 25 MILLIGRAM(S): at 18:31

## 2022-10-15 RX ADMIN — LATANOPROST 1 DROP(S): 0.05 SOLUTION/ DROPS OPHTHALMIC; TOPICAL at 22:09

## 2022-10-15 RX ADMIN — Medication 88 MICROGRAM(S): at 06:18

## 2022-10-15 NOTE — PROGRESS NOTE ADULT - PROBLEM SELECTOR PLAN 4
- C/w levothyroxine 88 mcg daily  - Clinically euthyroid.  - TSH elevated, as is T4. Head imaging showed no obv intracranial lesions.   - Hold off changes to dosage at this time.   - Await T3. - With this condition x20+ years. No recent med changes.   - C/w levothyroxine 88 mcg daily  - Clinically euthyroid.  - TSH elevated, as is T4. Head imaging showed no obv intracranial lesions.   - Hold off changes to dosage at this time.   - Await T3.

## 2022-10-15 NOTE — PROGRESS NOTE ADULT - PROBLEM SELECTOR PLAN 1
Pt with 1 month of colored floaters, head pressure, and off-balance sensation, now with worsening symptoms over the past 1 week after discontinuing acetazolamide. Pt found to have recurrence of papilledema, unclear etiology.  -Appreciate neuro and ophtho recs.  -MRI brain, MR orbits, MRV - severe claustrophobia, done under anesthesia. Findings reviewed, concerning for inc ICP. Rest of read as per official report.   -Pt refused diamox.  -LP attempted, unsuccessful - will c/s neurorads for image guided LP. Spoke w/ Dr. Moore re: case. Unable to perform today due to staffing issue - will f/u again in AM.

## 2022-10-16 LAB
APPEARANCE CSF: CLEAR — SIGNIFICANT CHANGE UP
APPEARANCE SPUN FLD: COLORLESS — SIGNIFICANT CHANGE UP
COLOR CSF: COLORLESS — SIGNIFICANT CHANGE UP
GLUCOSE CSF-MCNC: 56 MG/DL — SIGNIFICANT CHANGE UP (ref 40–70)
GRAM STN FLD: SIGNIFICANT CHANGE UP
LYMPHOCYTES # CSF: 28 % — SIGNIFICANT CHANGE UP
MONOS+MACROS NFR CSF: 50 % — SIGNIFICANT CHANGE UP
NEUTROPHILS # CSF: 22 % — SIGNIFICANT CHANGE UP
NRBC NFR CSF: 2 CELLS/UL — SIGNIFICANT CHANGE UP (ref 0–5)
PROT CSF-MCNC: 55 MG/DL — HIGH (ref 15–45)
RBC # CSF: 150 CELLS/UL — HIGH (ref 0–0)
SPECIMEN SOURCE: SIGNIFICANT CHANGE UP
T4 FREE SERPL-MCNC: 1.6 NG/DL — SIGNIFICANT CHANGE UP (ref 0.9–1.8)
TOTAL CELLS COUNTED, SPINAL FLUID: 32 CELLS — SIGNIFICANT CHANGE UP
TUBE TYPE: SIGNIFICANT CHANGE UP

## 2022-10-16 PROCEDURE — 62328 DX LMBR SPI PNXR W/FLUOR/CT: CPT

## 2022-10-16 PROCEDURE — 99232 SBSQ HOSP IP/OBS MODERATE 35: CPT

## 2022-10-16 RX ORDER — ACETAMINOPHEN 500 MG
650 TABLET ORAL EVERY 6 HOURS
Refills: 0 | Status: DISCONTINUED | OUTPATIENT
Start: 2022-10-16 | End: 2022-10-17

## 2022-10-16 RX ADMIN — Medication 650 MILLIGRAM(S): at 17:42

## 2022-10-16 RX ADMIN — Medication 88 MICROGRAM(S): at 05:23

## 2022-10-16 RX ADMIN — LATANOPROST 1 DROP(S): 0.05 SOLUTION/ DROPS OPHTHALMIC; TOPICAL at 21:32

## 2022-10-16 NOTE — PROGRESS NOTE ADULT - PROBLEM SELECTOR PLAN 4
- With this condition x20+ years. No recent med changes.   - C/w levothyroxine 88 mcg daily  - Clinically euthyroid.  - TSH elevated, as is T4. Head imaging showed no obv intracranial lesions.   - Hold off changes to dosage at this time.   - Await T3. - With this condition x20+ years. No recent med changes.   - C/w levothyroxine 88 mcg daily  - Clinically euthyroid.  - TSH elevated, as is T4. Head imaging showed no obv intracranial lesions [though not a dedicated study for pituitary].   - Hold off changes to dosage at this time.   - Will recheck T4 in the afternoon [if lab done too close to LT4 administration, may artificially bump thyroxine value]. If FT4 remains elevated on repeat - can follow up with hiram.

## 2022-10-16 NOTE — PROGRESS NOTE ADULT - PROBLEM SELECTOR PROBLEM 1
Papilledema, both eyes

## 2022-10-16 NOTE — PROGRESS NOTE ADULT - PROBLEM SELECTOR PLAN 1
Pt with 1 month of colored floaters, head pressure, and off-balance sensation, now with worsening symptoms over the past 1 week after discontinuing acetazolamide. Pt found to have recurrence of papilledema, unclear etiology.  -Appreciate neuro and ophtho recs.  -MRI brain, MR orbits, MRV - severe claustrophobia, done under anesthesia. Findings reviewed, concerning for inc ICP. Rest of read as per official report.   -Pt refused diamox  -Bedside LP unsuccessful.  -Neurorads to assist w/ LP on SUNDAY - in progress now.  -F/u post-procedure w/ neuro and ophtho for dispo clearance.

## 2022-10-16 NOTE — PROGRESS NOTE ADULT - SUBJECTIVE AND OBJECTIVE BOX
[facility] Division of [facility] Medicine  Adrian BarrazaKeith) MD Jc  Pager [phone]    SUBJECTIVE:  Chief complaint: Papilledema.    Pt seen and evaluated at bedside this AM.No new complaints. Anxious about LP. No HA/vision changes.       ROS: All systems negative except as noted.    Vital Signs Last 24 Hrs  T(C): 36.8 (16 Oct 2022 10:34), Max: 36.9 (15 Oct 2022 14:07)  T(F): 98.2 (16 Oct 2022 10:34), Max: 98.5 (15 Oct 2022 14:07)  HR: 75 (16 Oct 2022 10:34) (66 - 82)  BP: 106/67 (16 Oct 2022 10:34) (103/61 - 119/62)  BP(mean): --  RR: 19 (16 Oct 2022 10:34) (19 - 20)  SpO2: 98% (16 Oct 2022 10:34) (96% - 100%)    Parameters below as of 16 Oct 2022 10:34  Patient On (Oxygen Delivery Method): room air        PHYSICAL EXAM:  Gen- In bed, comfortable, NAD  Eyes- EOMI, PERRLA, nonicteric.  EMNT- Fair dentition. MMM. No tonsilar exudates. No posterior pharynx erythema.  Neck- Supple. No masses. No tracheal deviation.  Resp- CTAB, good effort. No r/r/w. No accessory muscle use.  CVS- RRR, S1S2, no g/r/m. No LE edema.  GI- Soft abd, NT, ND, +BSx4. No HSM.  MSK- No C/C. ROM intact. No crepitus.  Neuro- CN II-XII intact. Speech fluent/face symmetric. Sensation intact.  Skin- No rashes/ulcers. Warm/moist.  Psych- AAOx3. Appropriate mood/affect.      MEDICATION:  MEDICATIONS  (STANDING):  latanoprost 0.005% Ophthalmic Solution 1 Drop(s) Both EYES at bedtime  levothyroxine 88 MICROGram(s) Oral daily    MEDICATIONS  (PRN):  melatonin 3 milliGRAM(s) Oral at bedtime PRN Insomnia    LABORATORY:  No labs today        COVID-19 PCR: NotDetec (11 Oct 2022 10:55)  COVID-19 PCR: NotDetec (08 Oct 2022 13:42)

## 2022-10-16 NOTE — PROGRESS NOTE ADULT - PROBLEM SELECTOR PLAN 3
Pt reporting getting faint/lightheaded after walking up a flight of stairs, onset ~1 month ago. No episodes of syncope, however. Can be in setting of increased intracranial pressure. Lower suspicion of cardiogenic etiologies.   - Plan as above for papilledema  - Can check orthostatics.  - ECHO showed no acute issues. Nl RV/LV function. chronic valve issues.  - Fall risk protocol  - PT eval - no skilled needs.
Pt reporting getting faint/lightheaded after walking up a flight of stairs, onset ~1 month ago. No episodes of syncope, however. Can be in setting of increased intracranial pressure, though orthostatic hypotension, vasovagal syndrome, or cardiac etiology.  - Plan as above for papilledema  - Can check orthos in AM - pt at study w/ anesthesia.  - ECHO showed no acute issues. Nl RV/LV function. chronic valve issues.  - Fall risk protocol  - PT eval - no skilled needs.
Pt reporting getting faint/lightheaded after walking up a flight of stairs, onset ~1 month ago. No episodes of syncope, however. Can be in setting of increased intracranial pressure. Lower suspicion of cardiogenic etiologies.   - Plan as above for papilledema  - Orthos neg.  - ECHO showed no acute issues. Nl RV/LV function. chronic valve issues.  - Fall risk protocol  - PT eval - no skilled needs.
Pt reporting getting faint/lightheaded after walking up a flight of stairs, onset ~1 month ago. No episodes of syncope, however. Can be in setting of increased intracranial pressure, though orthostatic hypotension, vasovagal syndrome, or cardiac etiology.  - Plan as above for papilledema  - Check orthostatics  - TTE ordered to evaluate for structural heart disease (heart failure/valvular dysfunction)  - Fall risk protocol  - PT eval - no skilled needs.
Pt reporting getting faint/lightheaded after walking up a flight of stairs, onset ~1 month ago. No episodes of syncope, however. Can be in setting of increased intracranial pressure. Lower suspicion of cardiogenic etiologies.   - Plan as above for papilledema  - Orthos neg.  - ECHO showed no acute issues. Nl RV/LV function. chronic valve issues.  - Fall risk protocol  - PT eval - no skilled needs.

## 2022-10-16 NOTE — PROGRESS NOTE ADULT - PROBLEM SELECTOR PLAN 5
- DVT ppx: Hold lovenox pending LP.    Dispo- Anticipate to home pending w/u. Pending LP. - DVT ppx: Hold lovenox pending LP - restart once ok by neurorads.    Dispo- Anticipate to home pending w/u. Pending LP.

## 2022-10-16 NOTE — CHART NOTE - NSCHARTNOTEFT_GEN_A_CORE
PRE-INTERVENTIONAL RADIOLOGY PROCEDURE NOTE    Patient Age: 76 y/o   Patient Gender: F  Procedure (including site / side if known): LP  Diagnosis / Indication: papilledema  Interventional Radiology Attending Physician: Dr. Moore   Ordering Attending Physician: Dr. Jc Campbell  Pertinent medical history: hx of Hypothyroidism, and Papilledema diagnosed in 2020, Osteoporosis   Pertinent labs:                         13.0   6.79  )-----------( 316      ( 15 Oct 2022 05:57 )             39.9     10-15    137  |  103  |  13  ----------------------------<  91  3.8   |  22  |  0.71    Ca    9.1      15 Oct 2022 05:57  Phos  2.7     10-15  Mg     2.00     10-15      Patient and Family aware? Yes      Attending / Resident / NP / PA - Dr. Jc Campbell / Cassia Darling PA-C  Print Sign  Contact #: [phone]

## 2022-10-17 ENCOUNTER — TRANSCRIPTION ENCOUNTER (OUTPATIENT)
Age: 77
End: 2022-10-17

## 2022-10-17 VITALS
DIASTOLIC BLOOD PRESSURE: 66 MMHG | SYSTOLIC BLOOD PRESSURE: 112 MMHG | RESPIRATION RATE: 18 BRPM | OXYGEN SATURATION: 100 % | TEMPERATURE: 97 F | HEART RATE: 72 BPM

## 2022-10-17 LAB
CRP SERPL-MCNC: <3 MG/L — SIGNIFICANT CHANGE UP
CSF PCR RESULT: SIGNIFICANT CHANGE UP
ERYTHROCYTE [SEDIMENTATION RATE] IN BLOOD: 9 MM/HR — SIGNIFICANT CHANGE UP (ref 4–25)

## 2022-10-17 PROCEDURE — 99239 HOSP IP/OBS DSCHRG MGMT >30: CPT

## 2022-10-17 PROCEDURE — 99233 SBSQ HOSP IP/OBS HIGH 50: CPT

## 2022-10-17 RX ORDER — LANOLIN ALCOHOL/MO/W.PET/CERES
1 CREAM (GRAM) TOPICAL
Qty: 0 | Refills: 0 | DISCHARGE
Start: 2022-10-17

## 2022-10-17 RX ORDER — ACETAMINOPHEN 500 MG
2 TABLET ORAL
Qty: 0 | Refills: 0 | DISCHARGE
Start: 2022-10-17

## 2022-10-17 RX ADMIN — Medication 88 MICROGRAM(S): at 05:06

## 2022-10-17 NOTE — DISCHARGE NOTE PROVIDER - HOSPITAL COURSE
Loretta Miller is a 78 yo woman with history of hypothyroidism, papilledema (initially dx in 2020), and osteoporosis presents with 1 month of colored floaters, head pressure, and off-balance sensation, worse over the past 1 week, admitted for further workup of papilledema. Neuro and Ophthalmology on board. MRI done. Findings concerning for increased ICP. No masses, no other acute path noted.     # Papilledema, both eyes.   - Pt with 1 month of colored floaters, head pressure, and off-balance sensation, now with worsening symptoms over the past 1 week after discontinuing acetazolamide. Pt found to have recurrence of papilledema, unclear etiology.  - Appreciate neuro and Ophthalmology recs.  - MRI brain, MR orbits, MRV - severe claustrophobia, done under anesthesia. Findings reviewed, concerning for inc ICP. Rest of read as per official report.   - Pt refused Diamox  - s/p LP done with IR on 10/16 - Both opening and closing pressure was 11  - All CSF labs sent  - Plan for patient to follow up with Neuro opthalmology and Neurology within 1 -2 weeks upon discharge     # Intermittent Pressure in head.   - Pt with 1 month of head pressure associated with colored floaters and off-balance sensation.   - Likely 2/2 increased intracranial pressure.  - Plan as above for papilledema.    # Near syncope.   ·  reporting getting faint/lightheaded after walking up a flight of stairs, onset ~1 month ago. No episodes of syncope, however. Can be in setting of increased intracranial pressure. Lower suspicion of cardiogenic etiologies.   - Plan as above for papilledema  - Orthos neg.  - ECHO showed no acute issues. Nl RV/LV function. chronic valve issues.  - Fall risk protocol  - PT eval recs  no skilled needs.    # Hypothyroidism.   · With this condition x20+ years. No recent med changes.   - C/w levothyroxine 88 mcg daily  - Clinically euthyroid.  - TSH elevated, as is T4. Head imaging showed no obv intracranial lesions [though not a dedicated study for pituitary].   - Hold off changes to dosage at this time.   - Will recheck T4 in the afternoon [if lab done too close to LT4 administration, may artificially bump thyroxine value]. If FT4 remains elevated on repeat - can follow as outpatient with PCP    Patient remains hemodynamically stable, no new complaints/concerns at this time    Case discussed in details with medicine Attg    Loretta Miller is a 76 yo woman with history of hypothyroidism, papilledema (initially dx in 2020), and osteoporosis presents with 1 month of colored floaters, head pressure, and off-balance sensation, worse over the past 1 week, admitted for further workup of papilledema. Neuro and Ophthalmology on board. MRI done. Findings concerning for increased ICP. No masses, no other acute path noted.     # Papilledema, both eyes.   - Pt with 1 month of colored floaters, head pressure, and off-balance sensation, now with worsening symptoms over the past 1 week after discontinuing acetazolamide. Pt found to have recurrence of papilledema, unclear etiology.  - Appreciate neuro and Ophthalmology recs.  - MRI brain, MR orbits, MRV - severe claustrophobia, done under anesthesia. Findings reviewed, concerning for inc ICP. Rest of read as per official report.   - Pt refused Diamox  - s/p LP done with IR on 10/16 - Both opening and closing pressure was 11  - All CSF labs sent  - Plan for patient to follow up with Neuro opthalmology and Neurology within 1 -2 weeks upon discharge     # Intermittent Pressure in head.   - Pt with 1 month of head pressure associated with colored floaters and off-balance sensation.   - Likely 2/2 increased intracranial pressure.  - Plan as above for papilledema.  - Optho and Neuro did not recommend continuation of acetezolamide at this time.    # Near syncope.   ·  reporting getting faint/lightheaded after walking up a flight of stairs, onset ~1 month ago. No episodes of syncope, however. Can be in setting of increased intracranial pressure. Lower suspicion of cardiogenic etiologies.   - Plan as above for papilledema  - Orthos neg.  - ECHO showed no acute issues. Nl RV/LV function. chronic valve issues.  - Fall risk protocol  - PT eval recs  no skilled needs.    # Hypothyroidism.   · With this condition x20+ years. No recent med changes.   - C/w levothyroxine 88 mcg daily  - Clinically euthyroid.  - TSH elevated, fT4 wnl. Head imaging showed no obv intracranial lesions [though not a dedicated study for pituitary].   - Hold off changes to dosage at this time.   - Counseled pt to follow up with PCP in 4 weeks for repeat TSH at that time.    Labs still pending at time of d/c: ACE, ESR, CRP as requested by Neuro.    Patient remains hemodynamically stable, no new complaints/concerns at this time, she is adamant about going home today to take care of her  and disabled son.    Patient seen and examined by attending physician on morning of discharge day. Greater than 30 minutes spent on discharge preparation and education.

## 2022-10-17 NOTE — DISCHARGE NOTE NURSING/CASE MANAGEMENT/SOCIAL WORK - NSDCPEFALRISK_GEN_ALL_CORE
For information on Fall & Injury Prevention, visit: [email or web address] OR  [email or web address] OR  https://www.cdc.gov/steadi/patient.html

## 2022-10-17 NOTE — DISCHARGE NOTE PROVIDER - CARE PROVIDER_API CALL
Dr. Michael Nissenbaum,   [address], [place] [address].  Phone: [phone]  Fax: (   )    -  Follow Up Time: 2 weeks    Dr. Peg Cunningham.,   [facility]  Phone: (   )    -  Fax: (   )    -  Follow Up Time: 2 weeks

## 2022-10-17 NOTE — DISCHARGE NOTE PROVIDER - NSDCFUSCHEDAPPT_GEN_ALL_CORE_FT
Kirt Mantilla  [facility] Physician [facility]  CARDIOLOGY [address] [address] [facility]  Scheduled Appointment: 11/02/2022

## 2022-10-17 NOTE — DISCHARGE NOTE NURSING/CASE MANAGEMENT/SOCIAL WORK - PATIENT PORTAL LINK FT
You can access the FollowMyHealth Patient Portal offered by [facility] by registering at the following website: [email or web address]. By joining [facility]’s FollowMyHealth portal, you will also be able to view your health information using other applications (apps) compatible with our system.

## 2022-10-17 NOTE — DISCHARGE NOTE PROVIDER - NSDCMRMEDTOKEN_GEN_ALL_CORE_FT
acetaminophen 325 mg oral tablet: 2 tab(s) orally every 6 hours, As needed, Moderate Pain (4 - 6)  latanoprost 0.005% ophthalmic solution: 1 drop(s) to each affected eye once a day (in the evening)  levothyroxine 88 mcg (0.088 mg) oral tablet: 1 tab(s) orally once a day  melatonin 3 mg oral tablet: 1 tab(s) orally once a day (at bedtime), As needed, Insomnia

## 2022-10-17 NOTE — PROGRESS NOTE ADULT - ATTENDING COMMENTS
I have examined the pt at bedside.  The risks and the benefits of the proposed diagnostic/therapeutic approach were thoroughly discussed.   I agree with the above plan and have modified it where necessary.    76yo female with a PMHx of hypothyroidism and bilateral papilledema (first diagnosed 2020, resolved by 2021, now returned), MR Head/Orbits showing bilateral papilledema and findings were concerning for increased intracranial pressure.   LP done 10/16 with normal opening pressure.  Pt asymptomatic with good VA, mild cataracts.   Exam intact, non-focal.     Impression: Visual disturbance, resolved. Bilateral papilledema of uncertain etiology.    Recommendations:  -Follow results of CSF exam  -Would not recommend Diamox at this time given normal opening pressure.  -Please send serum ACE, ESR, CRP, RF. These labs can be followed up outpatient.  -Patient can follow up with Dr. Michael Nissenbaum after discharge. Please instruct the patient to call [phone] to schedule an appointment. Office is located at [address] [place] [address], [place] [address].   -Patient should also follow up with her outpatient neuro ophthalmologist, Dr. Peg Cunningham.

## 2022-10-17 NOTE — PROGRESS NOTE ADULT - REASON FOR ADMISSION
Head pressure, off-balance sensation
Salvadorean
Head pressure, off-balance sensation

## 2022-10-17 NOTE — DISCHARGE NOTE PROVIDER - NSDCFUADDAPPT_GEN_ALL_CORE_FT
Please follow up with Neuro Opthalmology and Neurology, and your Primary Care Doctor within 2 weeks upon discharge

## 2022-10-17 NOTE — DISCHARGE NOTE PROVIDER - NSDCCPCAREPLAN_GEN_ALL_CORE_FT
PRINCIPAL DISCHARGE DIAGNOSIS  Diagnosis: Papilledema, both eyes  Assessment and Plan of Treatment: You were admitted at [facility] for further work up of your well knonw history of Papilledema. Because of this condition, you sometimes experience pressure in your brain. You were seen by Ophthalmology and Neurology Team. Your imaging was consistent with papilledema, and microvascular disease in the brain. You did a Lumbar Puncture with the Interventional Radiology team for further management. All the Cerebrospinal Fluid labs were sent, and can be followed up as outpatient at the Neurology Clinic.  - Please call and make an appointment to follow up with Neurology and your private Neuro Opthalmology Doctor within 1-2 weeks upon discharge  - Please continue with your home medications as indicated on your discharge paper      SECONDARY DISCHARGE DIAGNOSES  Diagnosis: Hypothyroidism  Assessment and Plan of Treatment: Please continue with your home dose regimen as indicated on your discharge paper  Please follow up with your Primary Care Doctor and repeat Thyroid functions test in 4-6 weeks

## 2022-10-17 NOTE — PROGRESS NOTE ADULT - SUBJECTIVE AND OBJECTIVE BOX
MRN-[id number]    Subjective: 76 yo female seen and examined at bedside. Patient states she had sensation of head pressure (not headache)    PAST MEDICAL & SURGICAL HISTORY:  Hypothyroidism      Papilledema      History of tonsillectomy        FAMILY HISTORY:  Family history of diabetes mellitus (Father)      Social Hx:  Nonsmoker, no drug or alcohol use    Home Medications:  latanoprost 0.005% ophthalmic solution: 1 drop(s) to each affected eye once a day (in the evening) (11 Oct 2022 22:54)  levothyroxine 88 mcg (0.088 mg) oral tablet: 1 tab(s) orally once a day (11 Oct 2022 22:54)    MEDICATIONS  (STANDING):  latanoprost 0.005% Ophthalmic Solution 1 Drop(s) Both EYES at bedtime  levothyroxine 88 MICROGram(s) Oral daily    MEDICATIONS  (PRN):  acetaminophen     Tablet .. 650 milliGRAM(s) Oral every 6 hours PRN Moderate Pain (4 - 6)  melatonin 3 milliGRAM(s) Oral at bedtime PRN Insomnia    Allergies  penicillin (Hives)    Intolerances      REVIEW OF SYSTEMS  General:	  Skin/Breast:	  Ophthalmologic:  ENMT:	  Respiratory and Thorax:	  Cardiovascular:	  Gastrointestinal:	  Genitourinary:	  Musculoskeletal:	  Neurological:	  Psychiatric:	  Hematology/Lymphatics:	  Endocrine:	  Allergic/Immunologic:	    ROS: Pertinent positives above, all other ROS were reviewed and are negative.      Vital Signs Last 24 Hrs  T(C): 36.3 (17 Oct 2022 05:08), Max: 37.1 (16 Oct 2022 13:32)  T(F): 97.4 (17 Oct 2022 05:08), Max: 98.7 (16 Oct 2022 13:32)  HR: 72 (17 Oct 2022 05:08) (72 - 84)  BP: 112/66 (17 Oct 2022 05:08) (112/66 - 135/74)  BP(mean): --  RR: 18 (17 Oct 2022 05:08) (18 - 20)  SpO2: 100% (17 Oct 2022 05:08) (99% - 100%)    Parameters below as of 17 Oct 2022 05:08  Patient On (Oxygen Delivery Method): room air        GENERAL EXAM:  Constitutional: awake and alert. NAD  HEENT: PERRLA, EOMI  Neck: Supple  Respiratory: Breath sounds are clear bilaterally  Cardiovascular: S1 and S2, regular / irregular rhythm  Gastrointestinal: soft, nontender  Extremities: no edema, no cyanosis  Vascular: no carotid bruits  Musculoskeletal: no joint swelling/tenderness, no abnormal movements  Skin: no rashes    NEUROLOGICAL EXAM:  MS: AAOX3, fluent, attends b/l; recent and remote memory intact; normal attention, language and fund of knowledge.   CN: VFF, EOMI, PERRL, no JARROD, no APD,  V1-3 intact, no facial asymmetry, t/p midline, SCM/trap intact.  Eyes-Fundi: no papilledema.  Motor: Strength: 5/5 4x. Tone: normal. Bulk: normal. DTR 2+ symm.  Plantar flex b/l. Sensation: intact to LT/PP/Vibration/Position/Temperature 4x.   Coordination: intact 4x.   Gait:  Romberg negative, pull test negative; walks with narrow base, pivots in 2 steps.    NIHSS  mRS    Labs:   cbc  Chem    TPro  x   /  Alb  3701  /  TBili  x   /  DBili  x   /  AST  x   /  ALT  x   /  AlkPhos  x   10-16    Coags  Lipids  A1C  Cardiac Markers    LIVER FUNCTIONS - ( 16 Oct 2022 13:15 )  Alb: 3701 mg/dL / Pro: x     / ALK PHOS: x     / ALT: x     / AST: x     / GGT: x           UA  CSF  Immunological Labs    Radiology: [id number]    Subjective: 78 yo female seen and examined at bedside. Patient states she had sensation of head pressure (not headache) associated with whole body shaking this morning. Did not lose consciousness, did not fall. She thinks it may be due to her anxiety as she is feeling very anxious being in the hospital and still not having any answers about what is causing her papilledema. Denies any visual symptoms currently.    PAST MEDICAL & SURGICAL HISTORY:  Hypothyroidism    Papilledema    History of tonsillectomy    FAMILY HISTORY:  Family history of diabetes mellitus (Father)    Social Hx:  Nonsmoker, no drug or alcohol use    Home Medications:  latanoprost 0.005% ophthalmic solution: 1 drop(s) to each affected eye once a day (in the evening) (11 Oct 2022 22:54)  levothyroxine 88 mcg (0.088 mg) oral tablet: 1 tab(s) orally once a day (11 Oct 2022 22:54)    MEDICATIONS  (STANDING):  latanoprost 0.005% Ophthalmic Solution 1 Drop(s) Both EYES at bedtime  levothyroxine 88 MICROGram(s) Oral daily    MEDICATIONS  (PRN):  acetaminophen     Tablet .. 650 milliGRAM(s) Oral every 6 hours PRN Moderate Pain (4 - 6)  melatonin 3 milliGRAM(s) Oral at bedtime PRN Insomnia    Allergies  penicillin (Hives)    ROS: Pertinent positives above, all other ROS were reviewed and are negative.      Vital Signs Last 24 Hrs  T(C): 36.3 (17 Oct 2022 05:08), Max: 37.1 (16 Oct 2022 13:32)  T(F): 97.4 (17 Oct 2022 05:08), Max: 98.7 (16 Oct 2022 13:32)  HR: 72 (17 Oct 2022 05:08) (72 - 84)  BP: 112/66 (17 Oct 2022 05:08) (112/66 - 135/74)  RR: 18 (17 Oct 2022 05:08) (18 - 20)  SpO2: 100% (17 Oct 2022 05:08) (99% - 100%)    Parameters below as of 17 Oct 2022 05:08  Patient On (Oxygen Delivery Method): room air    GENERAL EXAM:  Constitutional: Lying in bed, NAD.  Head: Normocephalic, atraumatic.  Extremities: No edema, no cyanosis.    NEUROLOGICAL EXAM:  MS: Alert, eyes open spontaneously. Speech is fluent, not slurred. Follows commands.  CN: PERRL. EOMI. Face symmetric.  Motor: All extremities antigravity without drift.   Coordination: Not assessed.  Gait: Normal gait, normal pivot.    Labs:   TPro  x   /  Alb  3701  /  TBili  x   /  DBili  x   /  AST  x   /  ALT  x   /  AlkPhos  x   10-16    LIVER FUNCTIONS - ( 16 Oct 2022 13:15 )  Alb: 3701 mg/dL / Pro: x     / ALK PHOS: x     / ALT: x     / AST: x     / GGT: x           Radiology:  -10/11 CTH: No mass effect, hemorrhage or evidence of acute intracranial pathology. Sinus mucosal thickening with evidence of chronic left sphenoid sinusitis.  -10/13 MRI BRAIN AND ORBITS w/wo: Imaging findings compatible with raised intracranial pressure. Evidence of bilateral papilledema. No acute intracranial hemorrhage, acute ischemia, or abnormal intracranial enhancement. Multiple nonspecific abnormal white matter foci of T2/FLAIR prolongation statistically favoring microvascular disease.  -10/13 MRV HEAD: No evidence of venous sinus thrombosis or overt imaging findings of stenosis.

## 2022-10-17 NOTE — DISCHARGE NOTE PROVIDER - PROVIDER TOKENS
FREE:[LAST:[Dr. Michael Nissenbaum],PHONE:[phone]],FAX:[(   )    -],ADDRESS:[[address] [place] [address].],FOLLOWUP:[2 weeks]],FREE:[LAST:[Dr. Peg Soler],PHONE:[(   )    -],FAX:[(   )    -],ADDRESS:[[facility]],FOLLOWUP:[2 weeks]]

## 2022-10-17 NOTE — PROGRESS NOTE ADULT - ASSESSMENT
76 yo woman with history of hypothyroidism, papilledema (initially dx in 2020), and osteoporosis presents with 1 month of colored floaters, head pressure, and off-balance sensation, worse over the past 1 week, admitted for further workup of papilledema. Neuro and ophtho on board. 
76 yo woman with history of hypothyroidism, papilledema (initially dx in 2020), and osteoporosis presents with 1 month of colored floaters, head pressure, and off-balance sensation, worse over the past 1 week, admitted for further workup of papilledema. Neuro and ophtho on board. MRI done. Findings concerning for increased ICP. No masses, no other acute path noted. LP at bedside attempted, but was unsuccessful. Pending neurorads intervention. 
76 yo woman with history of hypothyroidism, papilledema (initially dx in 2020), and osteoporosis presents with 1 month of colored floaters, head pressure, and off-balance sensation, worse over the past 1 week, admitted for further workup of papilledema. Neuro and ophtho on board. MRI done. Findings concerning for increased ICP. No masses, no other acute path noted. LP at bedside attempted, but was unsuccessful. Pending neurorads intervention. 
Assessment: 78 yo female with a PMHx of hypothyroidism and bilateral papilledema (first diagnosed 2020, resolved by 2021, now returned) who presented to [facility] on 10/11 at the behest of her outpatient neuro ophthalmologist (Dr. Peg Cunningham) to have MR imaging done under anesthesia due to her severe claustrophobia/anxiety. MR Head/Orbits showed bilateral papilledema and findings were concerning for increased intracranial pressure, however LP done 10/16 with normal opening pressure.     Impression: Visual disturbance, now resolved. Bilateral papilledema of uncertain etiology. MR findings consistent with increased intracranial pressure, but LP done with normal opening pressure.    Recommendations:  [] Neuro checks per routine.  [x] MRI Head/Orbits w/wo: results as above.  [x] MRV Head: results as above.  [x] s/p LP, CSF glucose 56, protein 55 <H>, TNC 2,  <H>, culture NGTD, PCR panel neg. Opening pressure 11.  [] Would not recommend Diamox at this time given normal opening pressure.  [] f/u pending CSF studies: flow cytometry, MOG, MBP, NMO, oligoclonal bands.  [] f/u serum NMO, MOG, pending.  [] Please send serum ACE, ESR, CRP, RF. These labs can be followed up outpatient.  [] Patient can follow up with Dr. Michael Nissenbaum after discharge. Please instruct the patient to call [phone] to schedule an appointment. Office is located at [address] [place] [address].   [] Patient should also follow up with her outpatient neuro ophthalmologist, Dr. Peg Cunningham.  [] No further inpatient neurologic workup indicated (once the above labs are drawn). Patient is neurologically cleared to be discharged.   [] Neurology signing off. Please reconsult PRN or call [phone] with any questions.   [] Rest of care per primary team.
78 yo woman with history of hypothyroidism, papilledema (initially dx in 2020), and osteoporosis presents with 1 month of colored floaters, head pressure, and off-balance sensation, worse over the past 1 week, admitted for further workup of papilledema. Neuro and ophtho on board. 
76 yo woman with history of hypothyroidism, papilledema (initially dx in 2020), and osteoporosis presents with 1 month of colored floaters, head pressure, and off-balance sensation, worse over the past 1 week, admitted for further workup of papilledema. Neuro and ophtho on board. MRI done. Findings concerning for increased ICP. No masses, no other acute path noted. LP at bedside attempted, but was unsuccessful. Pending neurorads intervention.

## 2022-10-18 PROBLEM — H47.10 UNSPECIFIED PAPILLEDEMA: Chronic | Status: ACTIVE | Noted: 2022-10-12

## 2022-10-18 PROBLEM — E03.9 HYPOTHYROIDISM, UNSPECIFIED: Chronic | Status: ACTIVE | Noted: 2022-10-12

## 2022-10-18 LAB
RHEUMATOID FACT SERPL-ACNC: <10 IU/ML — SIGNIFICANT CHANGE UP (ref 0–13)
TM INTERPRETATION: SIGNIFICANT CHANGE UP

## 2022-10-19 ENCOUNTER — NON-APPOINTMENT (OUTPATIENT)
Age: 77
End: 2022-10-19

## 2022-10-19 LAB
ACE SERPL-CCNC: 23 U/L — SIGNIFICANT CHANGE UP (ref 14–82)
CULTURE RESULTS: NO GROWTH — SIGNIFICANT CHANGE UP
MBP CSF-MCNC: 2.4 NG/ML — SIGNIFICANT CHANGE UP (ref 0–5.6)
SPECIMEN SOURCE: SIGNIFICANT CHANGE UP
T3 SERPL-MCNC: 63 NG/DL — SIGNIFICANT CHANGE UP

## 2022-10-20 LAB
AQP4 H2O CHANNEL IGG CSF QL: NEGATIVE — SIGNIFICANT CHANGE UP
MOG AB SER QL CBA IFA: NEGATIVE — SIGNIFICANT CHANGE UP

## 2022-10-21 ENCOUNTER — APPOINTMENT (OUTPATIENT)
Dept: INTERNAL MEDICINE | Facility: CLINIC | Age: 77
End: 2022-10-21

## 2022-10-21 VITALS
TEMPERATURE: 97.2 F | BODY MASS INDEX: 23.59 KG/M2 | DIASTOLIC BLOOD PRESSURE: 76 MMHG | SYSTOLIC BLOOD PRESSURE: 130 MMHG | OXYGEN SATURATION: 98 % | HEIGHT: 59 IN | RESPIRATION RATE: 16 BRPM | WEIGHT: 117 LBS | HEART RATE: 71 BPM

## 2022-10-21 DIAGNOSIS — G93.2 BENIGN INTRACRANIAL HYPERTENSION: ICD-10-CM

## 2022-10-21 LAB — OLIGOCLONAL BANDS CSF ELPH-IMP: SIGNIFICANT CHANGE UP

## 2022-10-21 PROCEDURE — 99496 TRANSJ CARE MGMT HIGH F2F 7D: CPT

## 2022-10-21 RX ORDER — LATANOPROST/PF 0.005 %
0.01 DROPS OPHTHALMIC (EYE)
Qty: 5 | Refills: 0 | Status: ACTIVE | COMMUNITY
Start: 2022-10-18

## 2022-10-21 RX ORDER — COVID-19 ANTIGEN TEST
KIT MISCELLANEOUS
Qty: 8 | Refills: 0 | Status: DISCONTINUED | COMMUNITY
Start: 2022-09-01

## 2022-10-21 RX ORDER — ACETAZOLAMIDE 250 MG/1
250 TABLET ORAL
Qty: 30 | Refills: 0 | Status: DISCONTINUED | COMMUNITY
Start: 2022-09-12

## 2022-10-21 NOTE — REVIEW OF SYSTEMS
[Fatigue] : fatigue [Vision Problems] : vision problems [Headache] : headache [Pain] : no pain [Redness] : no redness [Itching] : no itching [Chest Pain] : no chest pain [Palpitations] : no palpitations [Lower Ext Edema] : no lower extremity edema [Shortness Of Breath] : no shortness of breath [Cough] : no cough [Dyspnea on Exertion] : no dyspnea on exertion [Abdominal Pain] : no abdominal pain [Nausea] : no nausea [Constipation] : no constipation [Diarrhea] : diarrhea [Vomiting] : no vomiting [Dizziness] : no dizziness [Fainting] : no fainting [Confusion] : no confusion [Memory Loss] : no memory loss [de-identified] : gait instability

## 2022-10-21 NOTE — PHYSICAL EXAM
[No Acute Distress] : no acute distress [Well Nourished] : well nourished [Well Developed] : well developed [Well-Appearing] : well-appearing [Normal Sclera/Conjunctiva] : normal sclera/conjunctiva [PERRL] : pupils equal round and reactive to light [EOMI] : extraocular movements intact [Normal Rate] : normal rate  [Regular Rhythm] : with a regular rhythm [Normal S1, S2] : normal S1 and S2 [No Murmur] : no murmur heard [Soft] : abdomen soft [Non-distended] : non-distended [No Masses] : no abdominal mass palpated [No Focal Deficits] : no focal deficits [Normal Affect] : the affect was normal [Alert and Oriented x3] : oriented to person, place, and time [No Respiratory Distress] : no respiratory distress  [No Accessory Muscle Use] : no accessory muscle use [Clear to Auscultation] : lungs were clear to auscultation bilaterally

## 2022-10-21 NOTE — HISTORY OF PRESENT ILLNESS
[Post-hospitalization from ___ Hospital] : Post-hospitalization from [unfilled] Hospital [Admitted on: ___] : The patient was admitted on [unfilled] [Discharged on ___] : discharged on [unfilled] [Discharge Summary] : discharge summary [Pertinent Labs] : pertinent labs [Radiology Findings] : radiology findings [Discharge Med List] : discharge medication list [Med Reconciliation] : medication reconciliation has been completed [FreeTextEntry2] : 76 yo F with PMH papilledema diagnosed in 2020, hypothyroidism and osteoporosis  here for hospital follow up.\par Pt was admitted to [facility] from 10/11-10/17 for papilledema right > left workup. Pt was experiencing 1 month of floaters, head pressure, and feeling off balance which had worsened. Pt saw neuro and was on Diamox for 3 weeks which did not help. MRI was done concerning for increased ICP but no masses seen. Neuro and Optho evaluated pt. She had an LP done with IR on 10/16. Pt had an episode of syncope before admission, TTE was done in the hospital which was normal. \par \par Pt saw Neurology Dr. Randhawa yesterday who is doing more studies and tests which she has scheduled in November. \par Pt will be following up with Neuro opthalmology Dr. Cunningham on Nov 14. and Neuro Optho Dr. Lim for a second opinion.\par \par Pt had a headache last night, took half an advil and half a tylenol with some relief. Pt woke up with head pressure this AM and took half a tylenol. Pt sees "blue" when she closes her eyes. Pt still has pressure of the head and feels exhausted and fatigued.\par \par Pt is very anxious and was previously on Valium many years ago. Pt has periods where her anxiety is worse and has had intermittent panic attacks. Pt is interested in medications to take PRN

## 2022-10-21 NOTE — PLAN
[FreeTextEntry1] : 78 yo F with PMH papilledema diagnosed in 2020, hypothyroidism and osteoporosis here for hospital follow up.\par Pt was admitted to [facility] from 10/11-10/17 for papilledema workup. \par \par #Papilledema/Increased ICP\par - Pt had MRI done concerning for increased ICP\par - Pt to f/u with neuro and neuro-ophtho\par - Pt was advised to take Advil/Tylenol PRN for headache\par \par #Anxiety \par - Will send rx for Valium to pharmacy \par - Educated on benzo misuse, pt understands

## 2022-10-25 LAB — MOG AB CSF QL CBA IFA: NEGATIVE — SIGNIFICANT CHANGE UP

## 2022-10-27 ENCOUNTER — NON-APPOINTMENT (OUTPATIENT)
Age: 77
End: 2022-10-27

## 2022-10-27 ENCOUNTER — APPOINTMENT (OUTPATIENT)
Dept: CARDIOLOGY | Facility: CLINIC | Age: 77
End: 2022-10-27

## 2022-10-27 VITALS
HEIGHT: 59 IN | DIASTOLIC BLOOD PRESSURE: 82 MMHG | BODY MASS INDEX: 24.19 KG/M2 | SYSTOLIC BLOOD PRESSURE: 143 MMHG | OXYGEN SATURATION: 96 % | WEIGHT: 120 LBS | HEART RATE: 86 BPM

## 2022-10-27 DIAGNOSIS — R42 DIZZINESS AND GIDDINESS: ICD-10-CM

## 2022-10-27 PROCEDURE — 99204 OFFICE O/P NEW MOD 45 MIN: CPT

## 2022-10-27 PROCEDURE — 93000 ELECTROCARDIOGRAM COMPLETE: CPT

## 2022-10-27 NOTE — HISTORY OF PRESENT ILLNESS
[FreeTextEntry1] : Loretta is a 77-year-old female here for initial evaluation.  Her past medical history is notable for hypothyroidism, papilledema, which was initially diagnosed in 2020, and osteoporosis.  She presented to the emergency room at [facility] on 10/11/2022 with headaches, and off-balance sensation, and floaters.  She was diagnosed with bilateral papilledema, and increased intracranial pressures.  She had a lumbar puncture, which demonstrated a normal opening and closing pressure.  She was evaluated by ophthalmology and neuro-ophthalmology, and the evaluation is in progress.  She did take acetazolamide for a period of time, though this was stopped because of normal pressures.\par \par She has no chest pain, difficulty breathing, or palpitations.  She does not have significant dyspnea on exertion.  Cardiac wise, her main complaint is a feeling of lightheadedness and near passing out, after bending down, and changing position.  She feels off, and like she may faint.  An echocardiogram during her hospitalization was unremarkable.  She has been monitoring her blood pressures at home, and they have been labile in the 105-130 range.\par \par She has no family history of premature CAD.  She denies all toxic habits.\par

## 2022-10-27 NOTE — DISCUSSION/SUMMARY
[FreeTextEntry1] : Loretta is a pleasant 77-year-old female here for initial evaluation.  She recently presented with lightheadedness, headaches, and near syncope, and was found to have bilateral papilledema.\par \par Her blood pressure is mildly elevated, though improved on repeat evaluation.  She is not orthostatic today.  Her physical exam is unremarkable.  Her EKG demonstrates a sinus rhythm, without obvious ischemia or chamber enlargement.  She had a recent echocardiogram which demonstrated normal left ventricular systolic function.\par \par Her symptoms do not seem to be of cardiac etiology, based on prior testing.  Her thyroid function is normal, and she will remain on Synthroid.  She is having a carotid Doppler with her neurologist in the upcoming weeks.  She is going to follow-up with her neurologist and neuro-ophthalmologist, regarding her bilateral papilledema.\par \par She knows to call with any issues or concerns.  We will speak in a few weeks to see how she is doing.\par  [EKG obtained to assist in diagnosis and management of assessed problem(s)] : EKG obtained to assist in diagnosis and management of assessed problem(s)

## 2022-11-02 ENCOUNTER — APPOINTMENT (OUTPATIENT)
Dept: OPHTHALMOLOGY | Facility: CLINIC | Age: 77
End: 2022-11-02

## 2022-11-02 ENCOUNTER — NON-APPOINTMENT (OUTPATIENT)
Age: 77
End: 2022-11-02

## 2022-11-02 PROCEDURE — 99204 OFFICE O/P NEW MOD 45 MIN: CPT

## 2022-11-02 PROCEDURE — 92083 EXTENDED VISUAL FIELD XM: CPT

## 2022-11-02 PROCEDURE — 92133 CPTRZD OPH DX IMG PST SGM ON: CPT

## 2022-12-26 ENCOUNTER — OUTPATIENT (OUTPATIENT)
Dept: OUTPATIENT SERVICES | Facility: HOSPITAL | Age: 77
LOS: 1 days | End: 2022-12-26
Payer: MEDICARE

## 2022-12-26 ENCOUNTER — APPOINTMENT (OUTPATIENT)
Dept: CT IMAGING | Facility: CLINIC | Age: 77
End: 2022-12-26

## 2022-12-26 DIAGNOSIS — Z00.8 ENCOUNTER FOR OTHER GENERAL EXAMINATION: ICD-10-CM

## 2022-12-26 DIAGNOSIS — Z90.89 ACQUIRED ABSENCE OF OTHER ORGANS: Chronic | ICD-10-CM

## 2022-12-26 PROCEDURE — 72128 CT CHEST SPINE W/O DYE: CPT | Mod: MH

## 2022-12-26 PROCEDURE — 72128 CT CHEST SPINE W/O DYE: CPT | Mod: 26,MH

## 2023-03-01 NOTE — PATIENT PROFILE ADULT - NSPROPTRIGHTBILLOFRIGHTS_GEN_A_NUR
Patient Education   Here is the plan from today's visit    1. Non-recurrent acute suppurative otitis media of left ear without spontaneous rupture of tympanic membrane  It appears your left ear is still infected so another round of antibiotics is warranted, but we will try a different antibiotic from what was used previously. Please complete 10 day course and follow up in about 2 weeks to have repeat exam to ensure infection has improved. Follow up sooner if new issues arise.   - cefdinir (OMNICEF) 300 MG capsule; Take 1 capsule (300 mg) by mouth 2 times daily for 10 days  Dispense: 20 capsule; Refill: 0        Please call or return to clinic if your symptoms don't go away.    Follow up plan  Return in about 2 weeks (around 3/14/2023).    Thank you for coming to [facility] today.  Lab Testing:  **If you had lab testing today and your results are reassuring or normal they will be mailed to you or sent through [employer] within 7 days.   **If the lab tests need quick action we will call you with the results.  **If you are having labs done on a different day, please call [phone] to schedule at [facility] or [phone] for other [facility] locations. Labs do not offer walk-in appointments.  The phone number we will call with results is # [phone] (home) . If this is not the best number please call our clinic and change the number.  Medication Refills:  If you need any refills please call your pharmacy and they will contact us.   If you need to  your refill at a new pharmacy, please contact the new pharmacy directly. The new pharmacy will help you get your medications transferred faster.   Scheduling:  If you have any concerns about today's visit or wish to schedule another appointment please call our office during normal business hours [phone] (8-5:00 M-F). If you can no longer make a scheduled visit, please cancel via [employer] or call us to cancel.   If a referral was made  to an [facility] [place] specialty provider and you do not get a call from central scheduling, please refer to directions on your visit summary or call our office during normal business hours for assistance.   If a Mammogram was ordered for you at the Breast Center call [phone] to schedule or change your appointment.  If you had an XRay/CT/Ultrasound/MRI ordered the number is [phone] to schedule or change your radiology appointment.   [facility] has limited ultrasound appointments available on Wednesdays, if you would like your ultrasound at [facility], please call [phone] to schedule.   Medical Concerns:  If you have urgent medical concerns please call [phone] at any time of the day.    Elin Saenz MD        patient

## 2023-03-02 ENCOUNTER — RX RENEWAL (OUTPATIENT)
Age: 78
End: 2023-03-02

## 2023-05-15 ENCOUNTER — RX RENEWAL (OUTPATIENT)
Age: 78
End: 2023-05-15

## 2023-05-25 NOTE — PROCEDURAL SAFETY CHECKLIST WITH OR WITHOUT SEDATION - NSPOSTDEBRIEFDT_GEN_ALL_CORE
14-Oct-2022 12:41 29y female no PMH presenting with left upper dental pain times several days.  Seen by private dentist when symptoms began, started on antibiotics which she is still taking, also seen in ED 2 days prior for same sx, given dental block, has follow-up appointment with private oral surgeon this coming Monday.  Pain worsened today.  No F/C, facial swelling, difficulty swallowing or breathing.  Tolerating p.o.    PE:  nad  skin warm, dry  ncat  ent- no facial swelling or trismus, op- WILL dental ttp, no drainage, no surrounding gingival or buccal erythema/edema, tongue/pharyn xnml  neck supple  rrr nl s1s2 no mrg  ctab no wrr  abd soft ntnd no palpable masses no rgr  back non-tender no cvat  ext no cce dpi  neuro aaox3 grossly nf exam

## 2023-07-25 ENCOUNTER — APPOINTMENT (OUTPATIENT)
Dept: INTERNAL MEDICINE | Facility: CLINIC | Age: 78
End: 2023-07-25
Payer: MEDICARE

## 2023-07-25 ENCOUNTER — LABORATORY RESULT (OUTPATIENT)
Age: 78
End: 2023-07-25

## 2023-07-25 VITALS
HEIGHT: 59 IN | OXYGEN SATURATION: 97 % | HEART RATE: 88 BPM | RESPIRATION RATE: 14 BRPM | SYSTOLIC BLOOD PRESSURE: 120 MMHG | WEIGHT: 122 LBS | DIASTOLIC BLOOD PRESSURE: 74 MMHG | BODY MASS INDEX: 24.6 KG/M2

## 2023-07-25 DIAGNOSIS — F41.9 ANXIETY DISORDER, UNSPECIFIED: ICD-10-CM

## 2023-07-25 DIAGNOSIS — H47.10 UNSPECIFIED PAPILLEDEMA: ICD-10-CM

## 2023-07-25 PROCEDURE — 99214 OFFICE O/P EST MOD 30 MIN: CPT

## 2023-07-25 NOTE — HISTORY OF PRESENT ILLNESS
[de-identified] : Here today for follow up.\par Followed by neuro- for increased incranial pressure- went on short course of Diamox- had follow up with neuro\par Neg MRI and LP\par Had follow up with EYE md. \par

## 2023-07-25 NOTE — ASSESSMENT
[FreeTextEntry1] : Hypothyroidism- stable- check thyroid results today. Continue levothyroxine 88mcg\par BABATUNDE- taking Diazepam 5mg sparingly- pt to take only once in a while \par

## 2023-07-26 LAB
25(OH)D3 SERPL-MCNC: 29.2 NG/ML
ALBUMIN SERPL ELPH-MCNC: 4.7 G/DL
ALP BLD-CCNC: 82 U/L
ALT SERPL-CCNC: 10 U/L
ANION GAP SERPL CALC-SCNC: 13 MMOL/L
APPEARANCE: CLEAR
AST SERPL-CCNC: 14 U/L
BACTERIA: NEGATIVE /HPF
BILIRUB SERPL-MCNC: 0.5 MG/DL
BILIRUBIN URINE: NEGATIVE
BLOOD URINE: ABNORMAL
BUN SERPL-MCNC: 16 MG/DL
CALCIUM SERPL-MCNC: 9.5 MG/DL
CAST: 0 /LPF
CHLORIDE SERPL-SCNC: 106 MMOL/L
CHOLEST SERPL-MCNC: 183 MG/DL
CO2 SERPL-SCNC: 24 MMOL/L
COLOR: YELLOW
CREAT SERPL-MCNC: 0.76 MG/DL
EGFR: 79 ML/MIN/1.73M2
EPITHELIAL CELLS: 0 /HPF
ESTIMATED AVERAGE GLUCOSE: 117 MG/DL
FOLATE SERPL-MCNC: 19.4 NG/ML
GLUCOSE QUALITATIVE U: NEGATIVE MG/DL
GLUCOSE SERPL-MCNC: 94 MG/DL
HBA1C MFR BLD HPLC: 5.7 %
HDLC SERPL-MCNC: 66 MG/DL
KETONES URINE: NEGATIVE MG/DL
LDLC SERPL CALC-MCNC: 102 MG/DL
LEUKOCYTE ESTERASE URINE: NEGATIVE
MICROSCOPIC-UA: NORMAL
NITRITE URINE: NEGATIVE
NONHDLC SERPL-MCNC: 117 MG/DL
PH URINE: 5
POTASSIUM SERPL-SCNC: 4 MMOL/L
PROT SERPL-MCNC: 7.1 G/DL
PROTEIN URINE: NEGATIVE MG/DL
RED BLOOD CELLS URINE: 2 /HPF
SODIUM SERPL-SCNC: 142 MMOL/L
SPECIFIC GRAVITY URINE: 1.02
T3RU NFR SERPL: 0.9 TBI
T4 FREE SERPL-MCNC: 2 NG/DL
TRIGL SERPL-MCNC: 78 MG/DL
TSH SERPL-ACNC: 0.68 UIU/ML
TSH SERPL-ACNC: 0.71 UIU/ML
UROBILINOGEN URINE: 0.2 MG/DL
VIT B12 SERPL-MCNC: 430 PG/ML
WHITE BLOOD CELLS URINE: 1 /HPF

## 2023-08-28 ENCOUNTER — RX RENEWAL (OUTPATIENT)
Age: 78
End: 2023-08-28

## 2023-11-30 ENCOUNTER — LABORATORY RESULT (OUTPATIENT)
Age: 78
End: 2023-11-30

## 2023-11-30 ENCOUNTER — APPOINTMENT (OUTPATIENT)
Dept: INTERNAL MEDICINE | Facility: CLINIC | Age: 78
End: 2023-11-30
Payer: MEDICARE

## 2023-11-30 ENCOUNTER — NON-APPOINTMENT (OUTPATIENT)
Age: 78
End: 2023-11-30

## 2023-11-30 VITALS
DIASTOLIC BLOOD PRESSURE: 70 MMHG | TEMPERATURE: 97.9 F | HEART RATE: 87 BPM | OXYGEN SATURATION: 98 % | BODY MASS INDEX: 25.61 KG/M2 | WEIGHT: 122 LBS | SYSTOLIC BLOOD PRESSURE: 126 MMHG | RESPIRATION RATE: 14 BRPM | HEIGHT: 58 IN

## 2023-11-30 DIAGNOSIS — Z00.00 ENCOUNTER FOR GENERAL ADULT MEDICAL EXAMINATION W/OUT ABNORMAL FINDINGS: ICD-10-CM

## 2023-11-30 DIAGNOSIS — E03.9 HYPOTHYROIDISM, UNSPECIFIED: ICD-10-CM

## 2023-11-30 DIAGNOSIS — Z23 ENCOUNTER FOR IMMUNIZATION: ICD-10-CM

## 2023-11-30 PROCEDURE — G0008: CPT

## 2023-11-30 PROCEDURE — 93000 ELECTROCARDIOGRAM COMPLETE: CPT

## 2023-11-30 PROCEDURE — 90662 IIV NO PRSV INCREASED AG IM: CPT

## 2023-11-30 PROCEDURE — G0439: CPT

## 2023-12-01 LAB
25(OH)D3 SERPL-MCNC: 25.8 NG/ML
ALBUMIN SERPL ELPH-MCNC: 4.9 G/DL
ALP BLD-CCNC: 54 U/L
ALT SERPL-CCNC: 8 U/L
ANION GAP SERPL CALC-SCNC: 14 MMOL/L
APPEARANCE: CLEAR
AST SERPL-CCNC: 14 U/L
BACTERIA: NEGATIVE /HPF
BASOPHILS # BLD AUTO: 0.04 K/UL
BASOPHILS NFR BLD AUTO: 0.8 %
BILIRUB SERPL-MCNC: 0.7 MG/DL
BILIRUBIN URINE: NEGATIVE
BLOOD URINE: ABNORMAL
BUN SERPL-MCNC: 15 MG/DL
CALCIUM SERPL-MCNC: 9.9 MG/DL
CAST: 0 /LPF
CHLORIDE SERPL-SCNC: 102 MMOL/L
CHOLEST SERPL-MCNC: 196 MG/DL
CO2 SERPL-SCNC: 24 MMOL/L
COLOR: YELLOW
CREAT SERPL-MCNC: 0.77 MG/DL
EGFR: 78 ML/MIN/1.73M2
EOSINOPHIL # BLD AUTO: 0.09 K/UL
EOSINOPHIL NFR BLD AUTO: 1.8 %
EPITHELIAL CELLS: 1 /HPF
ESTIMATED AVERAGE GLUCOSE: 114 MG/DL
FOLATE SERPL-MCNC: >20 NG/ML
GLUCOSE QUALITATIVE U: NEGATIVE MG/DL
GLUCOSE SERPL-MCNC: 91 MG/DL
HBA1C MFR BLD HPLC: 5.6 %
HCT VFR BLD CALC: 40.1 %
HDLC SERPL-MCNC: 66 MG/DL
HGB BLD-MCNC: 13 G/DL
IMM GRANULOCYTES NFR BLD AUTO: 0.4 %
KETONES URINE: NEGATIVE MG/DL
LDLC SERPL CALC-MCNC: 115 MG/DL
LEUKOCYTE ESTERASE URINE: NEGATIVE
LYMPHOCYTES # BLD AUTO: 1.61 K/UL
LYMPHOCYTES NFR BLD AUTO: 31.5 %
MAN DIFF?: NORMAL
MCHC RBC-ENTMCNC: 28 PG
MCHC RBC-ENTMCNC: 32.4 GM/DL
MCV RBC AUTO: 86.4 FL
MICROSCOPIC-UA: NORMAL
MONOCYTES # BLD AUTO: 0.59 K/UL
MONOCYTES NFR BLD AUTO: 11.5 %
NEUTROPHILS # BLD AUTO: 2.76 K/UL
NEUTROPHILS NFR BLD AUTO: 54 %
NITRITE URINE: NEGATIVE
NONHDLC SERPL-MCNC: 130 MG/DL
PH URINE: 5.5
PLATELET # BLD AUTO: 274 K/UL
POTASSIUM SERPL-SCNC: 3.9 MMOL/L
PROT SERPL-MCNC: 6.9 G/DL
PROTEIN URINE: NEGATIVE MG/DL
RBC # BLD: 4.64 M/UL
RBC # FLD: 13.7 %
RED BLOOD CELLS URINE: 1 /HPF
SODIUM SERPL-SCNC: 140 MMOL/L
SPECIFIC GRAVITY URINE: 1.01
T3RU NFR SERPL: 1 TBI
T4 FREE SERPL-MCNC: 1.6 NG/DL
TRIGL SERPL-MCNC: 84 MG/DL
TSH SERPL-ACNC: 4.13 UIU/ML
UROBILINOGEN URINE: 0.2 MG/DL
VIT B12 SERPL-MCNC: 397 PG/ML
WBC # FLD AUTO: 5.11 K/UL
WHITE BLOOD CELLS URINE: 0 /HPF

## 2023-12-05 RX ORDER — DIAZEPAM 5 MG/1
5 TABLET ORAL
Qty: 20 | Refills: 0 | Status: ACTIVE | COMMUNITY
Start: 2022-10-21 | End: 1900-01-01

## 2023-12-30 ENCOUNTER — EMERGENCY (EMERGENCY)
Facility: HOSPITAL | Age: 78
LOS: 1 days | Discharge: HOME | End: 2023-12-30
Attending: STUDENT IN AN ORGANIZED HEALTH CARE EDUCATION/TRAINING PROGRAM | Admitting: STUDENT IN AN ORGANIZED HEALTH CARE EDUCATION/TRAINING PROGRAM
Payer: MEDICARE

## 2023-12-30 VITALS
OXYGEN SATURATION: 98 % | DIASTOLIC BLOOD PRESSURE: 83 MMHG | RESPIRATION RATE: 18 BRPM | SYSTOLIC BLOOD PRESSURE: 147 MMHG | WEIGHT: 119.93 LBS | TEMPERATURE: 98 F | HEART RATE: 73 BPM | HEIGHT: 58 IN

## 2023-12-30 DIAGNOSIS — Z90.89 ACQUIRED ABSENCE OF OTHER ORGANS: Chronic | ICD-10-CM

## 2023-12-30 LAB
ALBUMIN SERPL ELPH-MCNC: 3.9 G/DL — SIGNIFICANT CHANGE UP (ref 3.3–5)
ALBUMIN SERPL ELPH-MCNC: 3.9 G/DL — SIGNIFICANT CHANGE UP (ref 3.3–5)
ALP SERPL-CCNC: 55 U/L — SIGNIFICANT CHANGE UP (ref 40–120)
ALP SERPL-CCNC: 55 U/L — SIGNIFICANT CHANGE UP (ref 40–120)
ALT FLD-CCNC: 17 U/L — SIGNIFICANT CHANGE UP (ref 12–78)
ALT FLD-CCNC: 17 U/L — SIGNIFICANT CHANGE UP (ref 12–78)
ANION GAP SERPL CALC-SCNC: 7 MMOL/L — SIGNIFICANT CHANGE UP (ref 5–17)
ANION GAP SERPL CALC-SCNC: 7 MMOL/L — SIGNIFICANT CHANGE UP (ref 5–17)
AST SERPL-CCNC: 18 U/L — SIGNIFICANT CHANGE UP (ref 15–37)
AST SERPL-CCNC: 18 U/L — SIGNIFICANT CHANGE UP (ref 15–37)
BASOPHILS # BLD AUTO: 0 K/UL — SIGNIFICANT CHANGE UP (ref 0–0.2)
BASOPHILS # BLD AUTO: 0 K/UL — SIGNIFICANT CHANGE UP (ref 0–0.2)
BASOPHILS NFR BLD AUTO: 0 % — SIGNIFICANT CHANGE UP (ref 0–2)
BASOPHILS NFR BLD AUTO: 0 % — SIGNIFICANT CHANGE UP (ref 0–2)
BILIRUB SERPL-MCNC: 0.5 MG/DL — SIGNIFICANT CHANGE UP (ref 0.2–1.2)
BILIRUB SERPL-MCNC: 0.5 MG/DL — SIGNIFICANT CHANGE UP (ref 0.2–1.2)
BUN SERPL-MCNC: 10 MG/DL — SIGNIFICANT CHANGE UP (ref 7–23)
BUN SERPL-MCNC: 10 MG/DL — SIGNIFICANT CHANGE UP (ref 7–23)
CALCIUM SERPL-MCNC: 8.3 MG/DL — LOW (ref 8.5–10.1)
CALCIUM SERPL-MCNC: 8.3 MG/DL — LOW (ref 8.5–10.1)
CHLORIDE SERPL-SCNC: 101 MMOL/L — SIGNIFICANT CHANGE UP (ref 96–108)
CHLORIDE SERPL-SCNC: 101 MMOL/L — SIGNIFICANT CHANGE UP (ref 96–108)
CO2 SERPL-SCNC: 26 MMOL/L — SIGNIFICANT CHANGE UP (ref 22–31)
CO2 SERPL-SCNC: 26 MMOL/L — SIGNIFICANT CHANGE UP (ref 22–31)
CREAT SERPL-MCNC: 0.7 MG/DL — SIGNIFICANT CHANGE UP (ref 0.5–1.3)
CREAT SERPL-MCNC: 0.7 MG/DL — SIGNIFICANT CHANGE UP (ref 0.5–1.3)
EGFR: 87 ML/MIN/1.73M2 — SIGNIFICANT CHANGE UP
EGFR: 87 ML/MIN/1.73M2 — SIGNIFICANT CHANGE UP
EOSINOPHIL # BLD AUTO: 0 K/UL — SIGNIFICANT CHANGE UP (ref 0–0.5)
EOSINOPHIL # BLD AUTO: 0 K/UL — SIGNIFICANT CHANGE UP (ref 0–0.5)
EOSINOPHIL NFR BLD AUTO: 0 % — SIGNIFICANT CHANGE UP (ref 0–6)
EOSINOPHIL NFR BLD AUTO: 0 % — SIGNIFICANT CHANGE UP (ref 0–6)
GLUCOSE SERPL-MCNC: 122 MG/DL — HIGH (ref 70–99)
GLUCOSE SERPL-MCNC: 122 MG/DL — HIGH (ref 70–99)
HCT VFR BLD CALC: 40.4 % — SIGNIFICANT CHANGE UP (ref 34.5–45)
HCT VFR BLD CALC: 40.4 % — SIGNIFICANT CHANGE UP (ref 34.5–45)
HGB BLD-MCNC: 13.4 G/DL — SIGNIFICANT CHANGE UP (ref 11.5–15.5)
HGB BLD-MCNC: 13.4 G/DL — SIGNIFICANT CHANGE UP (ref 11.5–15.5)
IMM GRANULOCYTES NFR BLD AUTO: 0.3 % — SIGNIFICANT CHANGE UP (ref 0–0.9)
IMM GRANULOCYTES NFR BLD AUTO: 0.3 % — SIGNIFICANT CHANGE UP (ref 0–0.9)
LYMPHOCYTES # BLD AUTO: 0.34 K/UL — LOW (ref 1–3.3)
LYMPHOCYTES # BLD AUTO: 0.34 K/UL — LOW (ref 1–3.3)
LYMPHOCYTES # BLD AUTO: 10.6 % — LOW (ref 13–44)
LYMPHOCYTES # BLD AUTO: 10.6 % — LOW (ref 13–44)
MAGNESIUM SERPL-MCNC: 1.9 MG/DL — SIGNIFICANT CHANGE UP (ref 1.6–2.6)
MAGNESIUM SERPL-MCNC: 1.9 MG/DL — SIGNIFICANT CHANGE UP (ref 1.6–2.6)
MCHC RBC-ENTMCNC: 27.3 PG — SIGNIFICANT CHANGE UP (ref 27–34)
MCHC RBC-ENTMCNC: 27.3 PG — SIGNIFICANT CHANGE UP (ref 27–34)
MCHC RBC-ENTMCNC: 33.2 GM/DL — SIGNIFICANT CHANGE UP (ref 32–36)
MCHC RBC-ENTMCNC: 33.2 GM/DL — SIGNIFICANT CHANGE UP (ref 32–36)
MCV RBC AUTO: 82.3 FL — SIGNIFICANT CHANGE UP (ref 80–100)
MCV RBC AUTO: 82.3 FL — SIGNIFICANT CHANGE UP (ref 80–100)
MONOCYTES # BLD AUTO: 0.13 K/UL — SIGNIFICANT CHANGE UP (ref 0–0.9)
MONOCYTES # BLD AUTO: 0.13 K/UL — SIGNIFICANT CHANGE UP (ref 0–0.9)
MONOCYTES NFR BLD AUTO: 4 % — SIGNIFICANT CHANGE UP (ref 2–14)
MONOCYTES NFR BLD AUTO: 4 % — SIGNIFICANT CHANGE UP (ref 2–14)
NEUTROPHILS # BLD AUTO: 2.73 K/UL — SIGNIFICANT CHANGE UP (ref 1.8–7.4)
NEUTROPHILS # BLD AUTO: 2.73 K/UL — SIGNIFICANT CHANGE UP (ref 1.8–7.4)
NEUTROPHILS NFR BLD AUTO: 85.1 % — HIGH (ref 43–77)
NEUTROPHILS NFR BLD AUTO: 85.1 % — HIGH (ref 43–77)
NRBC # BLD: 0 /100 WBCS — SIGNIFICANT CHANGE UP (ref 0–0)
NRBC # BLD: 0 /100 WBCS — SIGNIFICANT CHANGE UP (ref 0–0)
PLATELET # BLD AUTO: 226 K/UL — SIGNIFICANT CHANGE UP (ref 150–400)
PLATELET # BLD AUTO: 226 K/UL — SIGNIFICANT CHANGE UP (ref 150–400)
POTASSIUM SERPL-MCNC: 3.7 MMOL/L — SIGNIFICANT CHANGE UP (ref 3.5–5.3)
POTASSIUM SERPL-MCNC: 3.7 MMOL/L — SIGNIFICANT CHANGE UP (ref 3.5–5.3)
POTASSIUM SERPL-SCNC: 3.7 MMOL/L — SIGNIFICANT CHANGE UP (ref 3.5–5.3)
POTASSIUM SERPL-SCNC: 3.7 MMOL/L — SIGNIFICANT CHANGE UP (ref 3.5–5.3)
PROT SERPL-MCNC: 7.5 G/DL — SIGNIFICANT CHANGE UP (ref 6–8.3)
PROT SERPL-MCNC: 7.5 G/DL — SIGNIFICANT CHANGE UP (ref 6–8.3)
RBC # BLD: 4.91 M/UL — SIGNIFICANT CHANGE UP (ref 3.8–5.2)
RBC # BLD: 4.91 M/UL — SIGNIFICANT CHANGE UP (ref 3.8–5.2)
RBC # FLD: 13.1 % — SIGNIFICANT CHANGE UP (ref 10.3–14.5)
RBC # FLD: 13.1 % — SIGNIFICANT CHANGE UP (ref 10.3–14.5)
SODIUM SERPL-SCNC: 134 MMOL/L — LOW (ref 135–145)
SODIUM SERPL-SCNC: 134 MMOL/L — LOW (ref 135–145)
WBC # BLD: 3.21 K/UL — LOW (ref 3.8–10.5)
WBC # BLD: 3.21 K/UL — LOW (ref 3.8–10.5)
WBC # FLD AUTO: 3.21 K/UL — LOW (ref 3.8–10.5)
WBC # FLD AUTO: 3.21 K/UL — LOW (ref 3.8–10.5)

## 2023-12-30 PROCEDURE — 99285 EMERGENCY DEPT VISIT HI MDM: CPT | Mod: 25

## 2023-12-30 PROCEDURE — 71045 X-RAY EXAM CHEST 1 VIEW: CPT

## 2023-12-30 PROCEDURE — 93005 ELECTROCARDIOGRAM TRACING: CPT

## 2023-12-30 PROCEDURE — 83735 ASSAY OF MAGNESIUM: CPT

## 2023-12-30 PROCEDURE — 96374 THER/PROPH/DIAG INJ IV PUSH: CPT

## 2023-12-30 PROCEDURE — 36415 COLL VENOUS BLD VENIPUNCTURE: CPT

## 2023-12-30 PROCEDURE — 93010 ELECTROCARDIOGRAM REPORT: CPT

## 2023-12-30 PROCEDURE — 85025 COMPLETE CBC W/AUTO DIFF WBC: CPT

## 2023-12-30 PROCEDURE — 99284 EMERGENCY DEPT VISIT MOD MDM: CPT | Mod: FS

## 2023-12-30 PROCEDURE — 71045 X-RAY EXAM CHEST 1 VIEW: CPT | Mod: 26

## 2023-12-30 PROCEDURE — 80053 COMPREHEN METABOLIC PANEL: CPT

## 2023-12-30 RX ORDER — LEVOTHYROXINE SODIUM 125 MCG
1 TABLET ORAL
Qty: 0 | Refills: 0 | DISCHARGE

## 2023-12-30 RX ORDER — ONDANSETRON 8 MG/1
1 TABLET, FILM COATED ORAL
Qty: 1 | Refills: 0
Start: 2023-12-30 | End: 2024-01-01

## 2023-12-30 RX ORDER — ONDANSETRON 8 MG/1
4 TABLET, FILM COATED ORAL ONCE
Refills: 0 | Status: COMPLETED | OUTPATIENT
Start: 2023-12-30 | End: 2023-12-30

## 2023-12-30 RX ORDER — SODIUM CHLORIDE 9 MG/ML
1000 INJECTION INTRAMUSCULAR; INTRAVENOUS; SUBCUTANEOUS ONCE
Refills: 0 | Status: COMPLETED | OUTPATIENT
Start: 2023-12-30 | End: 2023-12-30

## 2023-12-30 RX ORDER — LATANOPROST 0.05 MG/ML
1 SOLUTION/ DROPS OPHTHALMIC; TOPICAL
Qty: 0 | Refills: 0 | DISCHARGE

## 2023-12-30 RX ADMIN — ONDANSETRON 4 MILLIGRAM(S): 8 TABLET, FILM COATED ORAL at 19:42

## 2023-12-30 RX ADMIN — SODIUM CHLORIDE 2000 MILLILITER(S): 9 INJECTION INTRAMUSCULAR; INTRAVENOUS; SUBCUTANEOUS at 19:42

## 2023-12-30 NOTE — ED ADULT NURSE NOTE - NSFALLUNIVINTERV_ED_ALL_ED
Bed/Stretcher in lowest position, wheels locked, appropriate side rails in place/Call bell, personal items and telephone in reach/Instruct patient to call for assistance before getting out of bed/chair/stretcher/Non-slip footwear applied when patient is off stretcher/[place] to call system/Physically safe environment - no spills, clutter or unnecessary equipment/Purposeful proactive rounding/Room/bathroom lighting operational, light cord in reach Bed/Stretcher in lowest position, wheels locked, appropriate side rails in place/Call bell, personal items and telephone in reach/Instruct patient to call for assistance before getting out of bed/chair/stretcher/Non-slip footwear applied when patient is off stretcher/[place] to call system/Physically safe environment - no spills, clutter or unnecessary equipment/Purposeful proactive rounding/Room/bathroom lighting operational, light cord in reach

## 2023-12-30 NOTE — ED PROVIDER NOTE - PATIENT PORTAL LINK FT
You can access the FollowMyHealth Patient Portal offered by [facility] by registering at the following website: [email or web address]. By joining [facility]’s FollowMyHealth portal, you will also be able to view your health information using other applications (apps) compatible with our system. You can access the FollowMyHealth Patient Portal offered by [facility] by registering at the following website: [email or web address]. By joining [facility]’s FollowMyHealth portal, you will also be able to view your health information using other applications (apps) compatible with our system.

## 2023-12-30 NOTE — ED PROVIDER NOTE - OBJECTIVE STATEMENT
86-year-old female with history of hypothyroidism presents to the ED complaining of nausea, mild cough, generalized weakness and intermittent shortness of breath.  Patient took a home COVID swab which was positive.  Patient states symptoms started 4 days ago.  Patient's  and daughter are also sick with similar symptoms.  Patient tolerating p.o. at home.  Denies fever, chest pain, abdominal pain, vomiting, diarrhea. Patient had a sore throat which has significantly improved. patient denies dizziness despite triage note.

## 2024-03-29 ENCOUNTER — RX RENEWAL (OUTPATIENT)
Age: 79
End: 2024-03-29

## 2024-09-23 ENCOUNTER — RX RENEWAL (OUTPATIENT)
Age: 79
End: 2024-09-23

## 2024-12-02 ENCOUNTER — APPOINTMENT (OUTPATIENT)
Dept: INTERNAL MEDICINE | Facility: CLINIC | Age: 79
End: 2024-12-02
Payer: MEDICARE

## 2024-12-02 VITALS
SYSTOLIC BLOOD PRESSURE: 128 MMHG | WEIGHT: 125 LBS | HEART RATE: 82 BPM | TEMPERATURE: 98 F | RESPIRATION RATE: 14 BRPM | HEIGHT: 58 IN | DIASTOLIC BLOOD PRESSURE: 76 MMHG | OXYGEN SATURATION: 98 % | BODY MASS INDEX: 26.24 KG/M2

## 2024-12-02 DIAGNOSIS — Z00.00 ENCOUNTER FOR GENERAL ADULT MEDICAL EXAMINATION W/OUT ABNORMAL FINDINGS: ICD-10-CM

## 2024-12-02 PROCEDURE — G0008: CPT

## 2024-12-02 PROCEDURE — G0439: CPT

## 2024-12-02 PROCEDURE — 90662 IIV NO PRSV INCREASED AG IM: CPT

## 2024-12-03 LAB
25(OH)D3 SERPL-MCNC: 23.9 NG/ML
ALBUMIN SERPL ELPH-MCNC: 4.4 G/DL
ALP BLD-CCNC: 53 U/L
ALT SERPL-CCNC: 8 U/L
ANION GAP SERPL CALC-SCNC: 13 MMOL/L
APPEARANCE: CLEAR
AST SERPL-CCNC: 15 U/L
BACTERIA: NEGATIVE /HPF
BASOPHILS # BLD AUTO: 0.04 K/UL
BASOPHILS NFR BLD AUTO: 0.7 %
BILIRUB SERPL-MCNC: 0.7 MG/DL
BILIRUBIN URINE: NEGATIVE
BLOOD URINE: NEGATIVE
BUN SERPL-MCNC: 12 MG/DL
CALCIUM SERPL-MCNC: 9.4 MG/DL
CAST: 0 /LPF
CHLORIDE SERPL-SCNC: 101 MMOL/L
CHOLEST SERPL-MCNC: 203 MG/DL
CO2 SERPL-SCNC: 26 MMOL/L
COLOR: YELLOW
CREAT SERPL-MCNC: 0.71 MG/DL
EGFR: 85 ML/MIN/1.73M2
EOSINOPHIL # BLD AUTO: 0.1 K/UL
EOSINOPHIL NFR BLD AUTO: 1.8 %
EPITHELIAL CELLS: 1 /HPF
ESTIMATED AVERAGE GLUCOSE: 120 MG/DL
FOLATE SERPL-MCNC: 17.8 NG/ML
GLUCOSE QUALITATIVE U: NEGATIVE MG/DL
GLUCOSE SERPL-MCNC: 91 MG/DL
HBA1C MFR BLD HPLC: 5.8 %
HCT VFR BLD CALC: 41 %
HCV AB SER QL: NONREACTIVE
HCV S/CO RATIO: 0.13 S/CO
HDLC SERPL-MCNC: 68 MG/DL
HGB BLD-MCNC: 13.1 G/DL
IMM GRANULOCYTES NFR BLD AUTO: 0.2 %
KETONES URINE: NEGATIVE MG/DL
LDLC SERPL CALC-MCNC: 110 MG/DL
LEUKOCYTE ESTERASE URINE: NEGATIVE
LYMPHOCYTES # BLD AUTO: 1.42 K/UL
LYMPHOCYTES NFR BLD AUTO: 25.5 %
MAN DIFF?: NORMAL
MCHC RBC-ENTMCNC: 27.9 PG
MCHC RBC-ENTMCNC: 32 G/DL
MCV RBC AUTO: 87.4 FL
MICROSCOPIC-UA: NORMAL
MONOCYTES # BLD AUTO: 0.67 K/UL
MONOCYTES NFR BLD AUTO: 12.1 %
NEUTROPHILS # BLD AUTO: 3.32 K/UL
NEUTROPHILS NFR BLD AUTO: 59.7 %
NITRITE URINE: NEGATIVE
NONHDLC SERPL-MCNC: 135 MG/DL
PH URINE: 6.5
PLATELET # BLD AUTO: 274 K/UL
POTASSIUM SERPL-SCNC: 4.1 MMOL/L
PROT SERPL-MCNC: 6.7 G/DL
PROTEIN URINE: NEGATIVE MG/DL
RBC # BLD: 4.69 M/UL
RBC # FLD: 13.7 %
RED BLOOD CELLS URINE: 3 /HPF
SODIUM SERPL-SCNC: 139 MMOL/L
SPECIFIC GRAVITY URINE: 1.02
TRIGL SERPL-MCNC: 146 MG/DL
TSH SERPL-ACNC: 3.89 UIU/ML
URATE SERPL-MCNC: 4 MG/DL
UROBILINOGEN URINE: 0.2 MG/DL
VIT B12 SERPL-MCNC: 404 PG/ML
WBC # FLD AUTO: 5.56 K/UL
WHITE BLOOD CELLS URINE: 1 /HPF

## 2025-03-20 ENCOUNTER — RX RENEWAL (OUTPATIENT)
Age: 80
End: 2025-03-20

## 2025-08-14 ENCOUNTER — APPOINTMENT (OUTPATIENT)
Dept: INTERNAL MEDICINE | Facility: CLINIC | Age: 80
End: 2025-08-14
Payer: MEDICARE

## 2025-08-14 VITALS
DIASTOLIC BLOOD PRESSURE: 80 MMHG | OXYGEN SATURATION: 96 % | SYSTOLIC BLOOD PRESSURE: 150 MMHG | HEART RATE: 87 BPM | RESPIRATION RATE: 14 BRPM | BODY MASS INDEX: 26.03 KG/M2 | WEIGHT: 124 LBS | TEMPERATURE: 98.5 F | HEIGHT: 58 IN

## 2025-08-14 VITALS — DIASTOLIC BLOOD PRESSURE: 82 MMHG | SYSTOLIC BLOOD PRESSURE: 138 MMHG

## 2025-08-14 DIAGNOSIS — Z01.818 ENCOUNTER FOR OTHER PREPROCEDURAL EXAMINATION: ICD-10-CM

## 2025-08-14 PROCEDURE — 93000 ELECTROCARDIOGRAM COMPLETE: CPT

## 2025-08-14 PROCEDURE — 99213 OFFICE O/P EST LOW 20 MIN: CPT

## 2025-08-14 PROCEDURE — G2211 COMPLEX E/M VISIT ADD ON: CPT

## 2025-09-12 ENCOUNTER — RX RENEWAL (OUTPATIENT)
Age: 80
End: 2025-09-12